# Patient Record
Sex: FEMALE | Race: WHITE | NOT HISPANIC OR LATINO
[De-identification: names, ages, dates, MRNs, and addresses within clinical notes are randomized per-mention and may not be internally consistent; named-entity substitution may affect disease eponyms.]

---

## 2017-02-06 ENCOUNTER — RX RENEWAL (OUTPATIENT)
Age: 52
End: 2017-02-06

## 2017-04-24 ENCOUNTER — APPOINTMENT (OUTPATIENT)
Dept: OBGYN | Facility: CLINIC | Age: 52
End: 2017-04-24

## 2017-04-24 VITALS
HEIGHT: 60 IN | WEIGHT: 143 LBS | BODY MASS INDEX: 28.07 KG/M2 | SYSTOLIC BLOOD PRESSURE: 118 MMHG | DIASTOLIC BLOOD PRESSURE: 70 MMHG

## 2017-04-27 LAB
CYTOLOGY CVX/VAG DOC THIN PREP: NORMAL
HPV HIGH+LOW RISK DNA PNL CVX: NEGATIVE

## 2017-05-31 ENCOUNTER — FORM ENCOUNTER (OUTPATIENT)
Age: 52
End: 2017-05-31

## 2017-05-31 ENCOUNTER — RX RENEWAL (OUTPATIENT)
Age: 52
End: 2017-05-31

## 2017-05-31 DIAGNOSIS — N64.89 OTHER SPECIFIED DISORDERS OF BREAST: ICD-10-CM

## 2017-06-01 ENCOUNTER — APPOINTMENT (OUTPATIENT)
Dept: MAMMOGRAPHY | Facility: CLINIC | Age: 52
End: 2017-06-01

## 2017-06-01 ENCOUNTER — APPOINTMENT (OUTPATIENT)
Dept: RADIOLOGY | Facility: CLINIC | Age: 52
End: 2017-06-01

## 2017-06-01 ENCOUNTER — OUTPATIENT (OUTPATIENT)
Dept: OUTPATIENT SERVICES | Facility: HOSPITAL | Age: 52
LOS: 1 days | End: 2017-06-01
Payer: COMMERCIAL

## 2017-06-01 DIAGNOSIS — Z00.8 ENCOUNTER FOR OTHER GENERAL EXAMINATION: ICD-10-CM

## 2017-06-01 PROCEDURE — 77063 BREAST TOMOSYNTHESIS BI: CPT

## 2017-06-01 PROCEDURE — 77080 DXA BONE DENSITY AXIAL: CPT

## 2017-06-01 PROCEDURE — 77067 SCR MAMMO BI INCL CAD: CPT

## 2017-06-06 ENCOUNTER — MESSAGE (OUTPATIENT)
Age: 52
End: 2017-06-06

## 2017-06-06 ENCOUNTER — FORM ENCOUNTER (OUTPATIENT)
Age: 52
End: 2017-06-06

## 2017-06-07 ENCOUNTER — APPOINTMENT (OUTPATIENT)
Dept: ULTRASOUND IMAGING | Facility: CLINIC | Age: 52
End: 2017-06-07

## 2017-06-07 ENCOUNTER — APPOINTMENT (OUTPATIENT)
Dept: MAMMOGRAPHY | Facility: CLINIC | Age: 52
End: 2017-06-07

## 2017-06-07 ENCOUNTER — OUTPATIENT (OUTPATIENT)
Dept: OUTPATIENT SERVICES | Facility: HOSPITAL | Age: 52
LOS: 1 days | End: 2017-06-07
Payer: COMMERCIAL

## 2017-06-07 DIAGNOSIS — Z00.8 ENCOUNTER FOR OTHER GENERAL EXAMINATION: ICD-10-CM

## 2017-06-07 PROCEDURE — 76641 ULTRASOUND BREAST COMPLETE: CPT

## 2017-06-07 PROCEDURE — G0279: CPT

## 2017-06-07 PROCEDURE — 77066 DX MAMMO INCL CAD BI: CPT

## 2017-06-12 ENCOUNTER — MESSAGE (OUTPATIENT)
Age: 52
End: 2017-06-12

## 2017-06-12 PROBLEM — N64.89 DISTORTION OF CONTOUR OF BREAST: Status: ACTIVE | Noted: 2017-06-12

## 2017-06-13 ENCOUNTER — RX RENEWAL (OUTPATIENT)
Age: 52
End: 2017-06-13

## 2017-06-13 RX ORDER — ESTRADIOL 1 MG/1
1 TABLET ORAL DAILY
Qty: 90 | Refills: 1 | Status: DISCONTINUED | COMMUNITY
Start: 2017-04-24 | End: 2017-06-13

## 2017-06-13 RX ORDER — PROGESTERONE 100 MG/1
100 CAPSULE ORAL
Qty: 90 | Refills: 1 | Status: DISCONTINUED | COMMUNITY
Start: 2017-04-24 | End: 2017-06-13

## 2017-11-22 ENCOUNTER — RX RENEWAL (OUTPATIENT)
Age: 52
End: 2017-11-22

## 2018-01-02 ENCOUNTER — OUTPATIENT (OUTPATIENT)
Dept: OUTPATIENT SERVICES | Facility: HOSPITAL | Age: 53
LOS: 1 days | Discharge: HOME | End: 2018-01-02

## 2018-01-02 VITALS
TEMPERATURE: 97 F | HEIGHT: 60 IN | RESPIRATION RATE: 27 BRPM | SYSTOLIC BLOOD PRESSURE: 113 MMHG | OXYGEN SATURATION: 98 % | DIASTOLIC BLOOD PRESSURE: 70 MMHG | WEIGHT: 141.1 LBS | HEART RATE: 69 BPM

## 2018-01-02 DIAGNOSIS — Z96.641 PRESENCE OF RIGHT ARTIFICIAL HIP JOINT: Chronic | ICD-10-CM

## 2018-01-02 DIAGNOSIS — Z80.0 FAMILY HISTORY OF MALIGNANT NEOPLASM OF DIGESTIVE ORGANS: ICD-10-CM

## 2018-01-02 RX ORDER — SODIUM CHLORIDE 9 MG/ML
1000 INJECTION INTRAMUSCULAR; INTRAVENOUS; SUBCUTANEOUS
Qty: 0 | Refills: 0 | Status: DISCONTINUED | OUTPATIENT
Start: 2018-01-02 | End: 2018-01-17

## 2018-01-02 RX ADMIN — SODIUM CHLORIDE 75 MILLILITER(S): 9 INJECTION INTRAMUSCULAR; INTRAVENOUS; SUBCUTANEOUS at 14:53

## 2018-01-02 NOTE — ASU PATIENT PROFILE, ADULT - VISION (WITH CORRECTIVE LENSES IF THE PATIENT USUALLY WEARS THEM):
right eye contact/Partially impaired: cannot see medication labels or newsprint, but can see obstacles in path, and the surrounding layout; can count fingers at arm's length

## 2018-01-10 DIAGNOSIS — Z80.0 FAMILY HISTORY OF MALIGNANT NEOPLASM OF DIGESTIVE ORGANS: ICD-10-CM

## 2018-01-10 DIAGNOSIS — J45.909 UNSPECIFIED ASTHMA, UNCOMPLICATED: ICD-10-CM

## 2018-01-10 DIAGNOSIS — Z88.0 ALLERGY STATUS TO PENICILLIN: ICD-10-CM

## 2018-01-10 DIAGNOSIS — K64.8 OTHER HEMORRHOIDS: ICD-10-CM

## 2018-01-10 DIAGNOSIS — Z88.1 ALLERGY STATUS TO OTHER ANTIBIOTIC AGENTS STATUS: ICD-10-CM

## 2018-01-10 DIAGNOSIS — Z96.641 PRESENCE OF RIGHT ARTIFICIAL HIP JOINT: ICD-10-CM

## 2018-01-10 DIAGNOSIS — F41.9 ANXIETY DISORDER, UNSPECIFIED: ICD-10-CM

## 2018-01-10 DIAGNOSIS — Z12.11 ENCOUNTER FOR SCREENING FOR MALIGNANT NEOPLASM OF COLON: ICD-10-CM

## 2018-01-11 ENCOUNTER — RX RENEWAL (OUTPATIENT)
Age: 53
End: 2018-01-11

## 2018-01-22 ENCOUNTER — APPOINTMENT (OUTPATIENT)
Dept: OBGYN | Facility: CLINIC | Age: 53
End: 2018-01-22
Payer: COMMERCIAL

## 2018-01-22 VITALS
BODY MASS INDEX: 27.09 KG/M2 | HEART RATE: 76 BPM | RESPIRATION RATE: 16 BRPM | HEIGHT: 60 IN | SYSTOLIC BLOOD PRESSURE: 110 MMHG | WEIGHT: 138 LBS | DIASTOLIC BLOOD PRESSURE: 70 MMHG

## 2018-01-22 PROCEDURE — 99214 OFFICE O/P EST MOD 30 MIN: CPT | Mod: 25

## 2018-01-22 RX ORDER — BENZONATATE 100 MG/1
100 CAPSULE ORAL
Qty: 15 | Refills: 0 | Status: DISCONTINUED | COMMUNITY
Start: 2017-01-03

## 2018-01-22 RX ORDER — SODIUM PHOSPHATE, MONOBASIC, MONOHYDRATE, SODIUM PHOSPHATE, DIBASIC ANHYDROUS 1.102; .398 G/1; G/1
1.102-0.398 TABLET ORAL
Qty: 32 | Refills: 0 | Status: DISCONTINUED | COMMUNITY
Start: 2017-12-29

## 2018-01-22 RX ORDER — FLUTICASONE PROPIONATE 50 UG/1
50 SPRAY, METERED NASAL
Qty: 16 | Refills: 0 | Status: DISCONTINUED | COMMUNITY
Start: 2017-01-03

## 2018-01-22 RX ORDER — ISOTRETINOIN 30 MG/1
30 CAPSULE ORAL
Qty: 60 | Refills: 0 | Status: DISCONTINUED | COMMUNITY
Start: 2017-09-01

## 2018-01-23 ENCOUNTER — FORM ENCOUNTER (OUTPATIENT)
Age: 53
End: 2018-01-23

## 2018-01-24 ENCOUNTER — APPOINTMENT (OUTPATIENT)
Dept: ULTRASOUND IMAGING | Facility: CLINIC | Age: 53
End: 2018-01-24
Payer: COMMERCIAL

## 2018-01-24 ENCOUNTER — OUTPATIENT (OUTPATIENT)
Dept: OUTPATIENT SERVICES | Facility: HOSPITAL | Age: 53
LOS: 1 days | End: 2018-01-24
Payer: COMMERCIAL

## 2018-01-24 DIAGNOSIS — Z00.8 ENCOUNTER FOR OTHER GENERAL EXAMINATION: ICD-10-CM

## 2018-01-24 DIAGNOSIS — Z96.641 PRESENCE OF RIGHT ARTIFICIAL HIP JOINT: Chronic | ICD-10-CM

## 2018-01-24 PROCEDURE — 76830 TRANSVAGINAL US NON-OB: CPT

## 2018-01-24 PROCEDURE — 76856 US EXAM PELVIC COMPLETE: CPT

## 2018-01-24 PROCEDURE — 76830 TRANSVAGINAL US NON-OB: CPT | Mod: 26

## 2018-01-24 PROCEDURE — 76856 US EXAM PELVIC COMPLETE: CPT | Mod: 26

## 2018-01-26 ENCOUNTER — CLINICAL ADVICE (OUTPATIENT)
Age: 53
End: 2018-01-26

## 2018-06-07 ENCOUNTER — FORM ENCOUNTER (OUTPATIENT)
Age: 53
End: 2018-06-07

## 2018-06-08 ENCOUNTER — APPOINTMENT (OUTPATIENT)
Dept: ULTRASOUND IMAGING | Facility: CLINIC | Age: 53
End: 2018-06-08

## 2018-06-08 ENCOUNTER — APPOINTMENT (OUTPATIENT)
Dept: MAMMOGRAPHY | Facility: CLINIC | Age: 53
End: 2018-06-08

## 2018-06-08 ENCOUNTER — OUTPATIENT (OUTPATIENT)
Dept: OUTPATIENT SERVICES | Facility: HOSPITAL | Age: 53
LOS: 1 days | End: 2018-06-08
Payer: COMMERCIAL

## 2018-06-08 DIAGNOSIS — Z00.8 ENCOUNTER FOR OTHER GENERAL EXAMINATION: ICD-10-CM

## 2018-06-08 DIAGNOSIS — Z96.641 PRESENCE OF RIGHT ARTIFICIAL HIP JOINT: Chronic | ICD-10-CM

## 2018-06-08 PROCEDURE — 77067 SCR MAMMO BI INCL CAD: CPT | Mod: 26

## 2018-06-08 PROCEDURE — 77063 BREAST TOMOSYNTHESIS BI: CPT | Mod: 26

## 2018-06-08 PROCEDURE — 77063 BREAST TOMOSYNTHESIS BI: CPT

## 2018-06-08 PROCEDURE — 77067 SCR MAMMO BI INCL CAD: CPT

## 2018-07-31 ENCOUNTER — RX RENEWAL (OUTPATIENT)
Age: 53
End: 2018-07-31

## 2018-08-01 ENCOUNTER — RX RENEWAL (OUTPATIENT)
Age: 53
End: 2018-08-01

## 2018-08-01 PROBLEM — F41.9 ANXIETY DISORDER, UNSPECIFIED: Chronic | Status: ACTIVE | Noted: 2018-01-02

## 2018-08-22 ENCOUNTER — APPOINTMENT (OUTPATIENT)
Dept: OBGYN | Facility: CLINIC | Age: 53
End: 2018-08-22
Payer: COMMERCIAL

## 2018-08-22 ENCOUNTER — RESULT CHARGE (OUTPATIENT)
Age: 53
End: 2018-08-22

## 2018-08-22 VITALS
BODY MASS INDEX: 28.47 KG/M2 | DIASTOLIC BLOOD PRESSURE: 70 MMHG | WEIGHT: 145 LBS | SYSTOLIC BLOOD PRESSURE: 114 MMHG | HEIGHT: 60 IN | RESPIRATION RATE: 16 BRPM | HEART RATE: 74 BPM

## 2018-08-22 DIAGNOSIS — N36.8 OTHER SPECIFIED DISORDERS OF URETHRA: ICD-10-CM

## 2018-08-22 DIAGNOSIS — Z80.0 FAMILY HISTORY OF MALIGNANT NEOPLASM OF DIGESTIVE ORGANS: ICD-10-CM

## 2018-08-22 DIAGNOSIS — R10.2 PELVIC AND PERINEAL PAIN: ICD-10-CM

## 2018-08-22 LAB
BILIRUB UR QL STRIP: NEGATIVE
BILIRUB UR QL STRIP: NORMAL
CLARITY UR: CLEAR
CLARITY UR: CLEAR
COLLECTION METHOD: NORMAL
COLLECTION METHOD: NORMAL
GLUCOSE UR-MCNC: NEGATIVE
GLUCOSE UR-MCNC: NORMAL
HCG UR QL: 0.2 EU/DL
HCG UR QL: 0.2 EU/DL
HGB UR QL STRIP.AUTO: NORMAL
HGB UR QL STRIP.AUTO: NORMAL
KETONES UR-MCNC: NEGATIVE
KETONES UR-MCNC: NORMAL
LEUKOCYTE ESTERASE UR QL STRIP: NEGATIVE
LEUKOCYTE ESTERASE UR QL STRIP: NORMAL
NITRITE UR QL STRIP: NEGATIVE
NITRITE UR QL STRIP: NORMAL
PH UR STRIP: 7.5
PH UR STRIP: 7.5
PROT UR STRIP-MCNC: NEGATIVE
PROT UR STRIP-MCNC: NORMAL
SP GR UR STRIP: 1.01
SP GR UR STRIP: 1.01

## 2018-08-22 PROCEDURE — 82270 OCCULT BLOOD FECES: CPT

## 2018-08-22 PROCEDURE — 81003 URINALYSIS AUTO W/O SCOPE: CPT | Mod: QW

## 2018-08-22 PROCEDURE — 99396 PREV VISIT EST AGE 40-64: CPT

## 2018-08-23 LAB
APPEARANCE: CLEAR
BACTERIA: NEGATIVE
BILIRUBIN URINE: NEGATIVE
BLOOD URINE: NEGATIVE
COLOR: YELLOW
GLUCOSE QUALITATIVE U: NEGATIVE MG/DL
HPV HIGH+LOW RISK DNA PNL CVX: NOT DETECTED
HYALINE CASTS: 0 /LPF
KETONES URINE: NEGATIVE
LEUKOCYTE ESTERASE URINE: NEGATIVE
MICROSCOPIC-UA: NORMAL
NITRITE URINE: NEGATIVE
PH URINE: 7.5
PROTEIN URINE: NEGATIVE MG/DL
RED BLOOD CELLS URINE: 2 /HPF
SPECIFIC GRAVITY URINE: 1.01
SQUAMOUS EPITHELIAL CELLS: 1 /HPF
UROBILINOGEN URINE: NEGATIVE MG/DL
WHITE BLOOD CELLS URINE: 0 /HPF

## 2018-08-24 LAB
CANDIDA VAG CYTO: NOT DETECTED
G VAGINALIS+PREV SP MTYP VAG QL MICRO: NOT DETECTED
T VAGINALIS VAG QL WET PREP: NOT DETECTED

## 2018-08-26 ENCOUNTER — RX RENEWAL (OUTPATIENT)
Age: 53
End: 2018-08-26

## 2018-08-26 LAB
BACTERIA UR CULT: ABNORMAL
CYTOLOGY CVX/VAG DOC THIN PREP: NORMAL
CYTOLOGY CVX/VAG DOC THIN PREP: NORMAL

## 2019-04-26 ENCOUNTER — RX RENEWAL (OUTPATIENT)
Age: 54
End: 2019-04-26

## 2019-04-29 ENCOUNTER — APPOINTMENT (OUTPATIENT)
Dept: OBGYN | Facility: CLINIC | Age: 54
End: 2019-04-29
Payer: COMMERCIAL

## 2019-04-29 VITALS
SYSTOLIC BLOOD PRESSURE: 98 MMHG | HEIGHT: 60 IN | WEIGHT: 145 LBS | BODY MASS INDEX: 28.47 KG/M2 | DIASTOLIC BLOOD PRESSURE: 60 MMHG

## 2019-04-29 PROCEDURE — 99213 OFFICE O/P EST LOW 20 MIN: CPT

## 2019-04-29 NOTE — HISTORY OF PRESENT ILLNESS
[___ Month(s) Ago] : [unfilled] month(s) ago [Definite:  ___ (Date)] : the last menstrual period was [unfilled] [Sexually Active] : is sexually active [Monogamous] : is monogamous [FreeTextEntry8] : denies

## 2019-04-29 NOTE — CHIEF COMPLAINT
[Follow Up] : follow up GYN visit [FreeTextEntry1] : 6 months surveillance of hormone replacement therapy

## 2019-05-01 ENCOUNTER — APPOINTMENT (OUTPATIENT)
Dept: ORTHOPEDIC SURGERY | Facility: CLINIC | Age: 54
End: 2019-05-01

## 2019-05-02 ENCOUNTER — APPOINTMENT (OUTPATIENT)
Dept: ORTHOPEDIC SURGERY | Facility: CLINIC | Age: 54
End: 2019-05-02
Payer: COMMERCIAL

## 2019-05-02 VITALS
HEART RATE: 77 BPM | SYSTOLIC BLOOD PRESSURE: 110 MMHG | DIASTOLIC BLOOD PRESSURE: 72 MMHG | TEMPERATURE: 98.6 F | HEIGHT: 60 IN | BODY MASS INDEX: 27.48 KG/M2 | WEIGHT: 140 LBS

## 2019-05-02 DIAGNOSIS — Z82.62 FAMILY HISTORY OF OSTEOPOROSIS: ICD-10-CM

## 2019-05-02 DIAGNOSIS — Z87.39 PERSONAL HISTORY OF OTHER DISEASES OF THE MUSCULOSKELETAL SYSTEM AND CONNECTIVE TISSUE: ICD-10-CM

## 2019-05-02 DIAGNOSIS — Z82.61 FAMILY HISTORY OF ARTHRITIS: ICD-10-CM

## 2019-05-02 PROCEDURE — 99214 OFFICE O/P EST MOD 30 MIN: CPT

## 2019-05-02 PROCEDURE — 73130 X-RAY EXAM OF HAND: CPT | Mod: 50

## 2019-05-03 PROBLEM — Z82.62 FAMILY HISTORY OF OSTEOPOROSIS: Status: ACTIVE | Noted: 2019-05-03

## 2019-05-03 PROBLEM — Z87.39 HISTORY OF ARTHRITIS: Status: RESOLVED | Noted: 2019-05-03 | Resolved: 2019-05-03

## 2019-05-03 PROBLEM — Z82.61 FAMILY HISTORY OF ARTHRITIS: Status: ACTIVE | Noted: 2019-05-03

## 2019-05-14 NOTE — HISTORY OF PRESENT ILLNESS
[(Does not interfere) 0] : the ailment interference is 0/10 (does not interfere) [4] : the ailment interference is 4/10 [5] : the ailment interference is 5/10 [de-identified] : The patient comes in today with complaints of pain to bilateral thumbs.  It is a little bit worse on the left.  The patient states she has had this condition for approximately 6 months.  This injury is not work related or due to an automobile accident.  The patient states the pain is constant, but varies in intensity.  The patient describes the pain as achy and sometimes sharp.  The patient notes rest makes her symptoms better, while typing or anything that involves gripping, makes her symptoms worse.  The patient indicates pain is at a level of 10 on a pain scale of 0-10. [] : No

## 2019-05-14 NOTE — DISCUSSION/SUMMARY
[de-identified] : At this time, due to basal joint arthritis of bilateral thumbs, I recommended a thumb splint, ice, anti-inflammatory and topical cream.  She will be reassessed in three to four weeks.

## 2019-05-14 NOTE — ADDENDUM
[FreeTextEntry1] : This note was written by America Torres on 05/09/2019 acting as a scribe for THIEN MACHUCA

## 2019-05-14 NOTE — PHYSICAL EXAM
[Normal] : Gait: normal [de-identified] : Right Thumb:\par                                                                                                 Claimant:  	   Normal:  \par \par Thumb Interphalangeal Hyperextension/Flexion		15H/80		   15H/80\par \par Thumb Metacarpophalangeal Hyperextension/Flexion	10/55		    10/55\par \par Finger DIP Joints Extension/Flexion			0/80		     0/80\par \par Finger PIP Joints Extension/Flexion 			0/100		     0/100\par \par Finger MCP Joints Hyperextension/Flexion 		(0-45H)/90	     (0-45H)/90\par \par \par Significant tenderness at the basal joint with a positive grind.  FDS, FDP intact.  No triggering.  No tenderness or swelling over the A1 pulley for each finger.  No instability to varus or valgus stress.  No motor or sensory deficits.  Less than two second capillary refill.  Skin is intact.  No rashes, scars or lesions.\par  \par Left Thumb:\par                                                                                                 Claimant:  	   Normal:  \par \par Thumb Interphalangeal Hyperextension/Flexion		15H/80		   15H/80\par \par Thumb Metacarpophalangeal Hyperextension/Flexion	10/55		    10/55\par \par Finger DIP Joints Extension/Flexion			0/80		     0/80\par \par Finger PIP Joints Extension/Flexion 			0/100		     0/100\par \par Finger MCP Joints Hyperextension/Flexion 		(0-45H)/90	     (0-45H)/90\par \par \par Significant tenderness at the basal joint with a positive grind.  FDS, FDP intact.  No triggering.  No tenderness or swelling over the A1 pulley for each finger.  No instability to varus or valgus stress.  No motor or sensory deficits.  Less than two second capillary refill.  Skin is intact.  No rashes, scars or lesions.\par  [de-identified] : Appearance:  Well-developed, well-nourished female in no acute distress.\par \par   [de-identified] : Radiographs, two views of bilateral thumbs, show moderate degenerative changes.

## 2019-05-29 ENCOUNTER — APPOINTMENT (OUTPATIENT)
Dept: ORTHOPEDIC SURGERY | Facility: CLINIC | Age: 54
End: 2019-05-29
Payer: COMMERCIAL

## 2019-05-29 VITALS
DIASTOLIC BLOOD PRESSURE: 79 MMHG | WEIGHT: 140 LBS | HEART RATE: 83 BPM | SYSTOLIC BLOOD PRESSURE: 125 MMHG | TEMPERATURE: 98.1 F | BODY MASS INDEX: 27.48 KG/M2 | HEIGHT: 60 IN

## 2019-05-29 PROCEDURE — 99213 OFFICE O/P EST LOW 20 MIN: CPT | Mod: 25

## 2019-05-29 PROCEDURE — 20600 DRAIN/INJ JOINT/BURSA W/O US: CPT | Mod: RT

## 2019-06-03 NOTE — PHYSICAL EXAM
[de-identified] : Right Fingers/Hand: \par Range of Motion\par                                                                                                 Claimant:  	   Normal:  \par \par Thumb Interphalangeal Hyperextension/Flexion		15H/80		   15H/80\par \par Thumb Metacarpophalangeal Hyperextension/Flexion	10/55		    10/55\par \par Finger DIP Joints Extension/Flexion			0/80		     0/80\par \par Finger PIP Joints Extension/Flexion 			0/100		     0/100\par \par Finger MCP Joints Hyperextension/Flexion 		(0-45H)/90	     (0-45H)/90\par \par \par Significant tenderness at the basal joint with a positive grind.  FDS, FDP intact.  No triggering.  No tenderness or swelling over the A1 pulley for each finger.  No instability to varus or valgus stress.  No motor or sensory deficits.  Less than two second capillary refill.  Skin is intact.  No rashes, scars or lesions.\par \par Left Fingers/Hand: \par Range of Motion\par                                                                                                 Claimant:  	   Normal:  \par \par Thumb Interphalangeal Hyperextension/Flexion		15H/80		   15H/80\par \par Thumb Metacarpophalangeal Hyperextension/Flexion	10/55		    10/55\par \par Finger DIP Joints Extension/Flexion			0/80		     0/80\par \par Finger PIP Joints Extension/Flexion 			0/100		     0/100\par \par Finger MCP Joints Hyperextension/Flexion 		(0-45H)/90	     (0-45H)/90\par \par \par Significant tenderness at the basal joint with a positive grind.  FDS, FDP intact.  No triggering.  No tenderness or swelling over the A1 pulley for each finger.  No instability to varus or valgus stress.  No motor or sensory deficits.  Less than two second capillary refill.  Skin is intact.  No rashes, scars or lesions.\par   [de-identified] : Ambulating with a normal gait.  Station:  Normal.  [de-identified] : General Appearance:  Well-developed, well-nourished female in no acute distress.

## 2019-06-03 NOTE — PROCEDURE
[de-identified] : Consent: \par At this time, I have recommended an injection to the right thumb.  The risks and benefits of the procedure were discussed with the patient in detail.  Upon verbal consent of the patient, we proceeded with the injection as noted below.  \par \par Procedure:  \par After a sterile prep, the patient underwent an injection of 2 cc of 1% Lidocaine without epinephrine and 0.5 cc of Kenalog into the right thumb.  The patient tolerated the procedure well.  There were no complications.

## 2019-06-03 NOTE — HISTORY OF PRESENT ILLNESS
[de-identified] : The patient comes in today for her bilateral thumbs with some persistent complaints, right worse than left.

## 2019-06-03 NOTE — DISCUSSION/SUMMARY
[de-identified] : At this time, I have recommended ice and elevation for the bilateral basal joint arthritis, right worse than left.  She will be reassessed in three to four weeks.

## 2019-06-03 NOTE — ADDENDUM
[FreeTextEntry1] : This note was written by Misty Ivan on 06/02/2019 acting as scribe for Jay Rowland III, MD\par

## 2019-06-12 ENCOUNTER — APPOINTMENT (OUTPATIENT)
Dept: ORTHOPEDIC SURGERY | Facility: CLINIC | Age: 54
End: 2019-06-12
Payer: COMMERCIAL

## 2019-06-12 VITALS
SYSTOLIC BLOOD PRESSURE: 118 MMHG | WEIGHT: 140 LBS | TEMPERATURE: 98.4 F | HEART RATE: 66 BPM | DIASTOLIC BLOOD PRESSURE: 75 MMHG | HEIGHT: 60 IN | BODY MASS INDEX: 27.48 KG/M2

## 2019-06-12 PROCEDURE — 20600 DRAIN/INJ JOINT/BURSA W/O US: CPT | Mod: LT

## 2019-06-19 NOTE — PHYSICAL EXAM
[de-identified] : Right Fingers/Hand: \par Range of Motion\par       Claimant: 	 Normal: \par \par Thumb Interphalangeal Hyperextension/Flexion		15H/80		 15H/80\par \par Thumb Metacarpophalangeal Hyperextension/Flexion	10/55		 10/55\par \par Finger DIP Joints Extension/Flexion			0/80		 0/80\par \par Finger PIP Joints Extension/Flexion 			0/100		 0/100\par \par Finger MCP Joints Hyperextension/Flexion 		(0-45H)/90	 (0-45H)/90\par \par \par Significant tenderness at the basal joint with a positive grind. FDS, FDP intact. No triggering. No tenderness or swelling over the A1 pulley for each finger. No instability to varus or valgus stress. No motor or sensory deficits. Less than two second capillary refill. Skin is intact. No rashes, scars or lesions.\par \par Left Fingers/Hand: \par Range of Motion\par       Claimant: 	 Normal: \par \par Thumb Interphalangeal Hyperextension/Flexion		15H/80		 15H/80\par \par Thumb Metacarpophalangeal Hyperextension/Flexion	10/55		 10/55\par \par Finger DIP Joints Extension/Flexion			0/80		 0/80\par \par Finger PIP Joints Extension/Flexion 			0/100		 0/100\par \par Finger MCP Joints Hyperextension/Flexion 		(0-45H)/90	 (0-45H)/90\par \par \par Significant tenderness at the basal joint with a positive grind. FDS, FDP intact. No triggering. No tenderness or swelling over the A1 pulley for each finger. No instability to varus or valgus stress. No motor or sensory deficits. Less than two second capillary refill. Skin is intact. No rashes, scars or lesions.\par

## 2019-06-19 NOTE — ADDENDUM
[FreeTextEntry1] : This note was written by Heather Bell on 06/17/2019 acting as scribe for MIKEY Bond MD

## 2019-06-19 NOTE — PROCEDURE
[de-identified] : Consent: \par At this time, I have recommended an injection to the left basal joint.  The risks and benefits of the procedure were discussed with the patient in detail.  Upon verbal consent of the patient, we proceeded with the injection as noted below.  \par \par Procedure:  \par After a sterile prep, the patient underwent an injection of 2 cc of 1% Lidocaine without epinephrine and 0.5 cc of Kenalog into the left basal joint.  The patient tolerated the procedure well.  There were no complications.  \par \par

## 2019-06-19 NOTE — HISTORY OF PRESENT ILLNESS
[de-identified] : The patient comes in today stating her right thumb is feeling much better.  She is still having pain to the left thumb.

## 2019-06-19 NOTE — DISCUSSION/SUMMARY
[de-identified] : At this time, due to basal joint arthritis of bilateral thumbs, I recommend ice and elevation for the left thumb and reassessment in 4-6 weeks or sooner shoulder her symptoms warrant.

## 2019-06-25 ENCOUNTER — FORM ENCOUNTER (OUTPATIENT)
Age: 54
End: 2019-06-25

## 2019-06-26 ENCOUNTER — APPOINTMENT (OUTPATIENT)
Dept: RADIOLOGY | Facility: CLINIC | Age: 54
End: 2019-06-26
Payer: COMMERCIAL

## 2019-06-26 ENCOUNTER — OUTPATIENT (OUTPATIENT)
Dept: OUTPATIENT SERVICES | Facility: HOSPITAL | Age: 54
LOS: 1 days | End: 2019-06-26
Payer: COMMERCIAL

## 2019-06-26 ENCOUNTER — APPOINTMENT (OUTPATIENT)
Dept: MAMMOGRAPHY | Facility: CLINIC | Age: 54
End: 2019-06-26
Payer: COMMERCIAL

## 2019-06-26 DIAGNOSIS — Z96.641 PRESENCE OF RIGHT ARTIFICIAL HIP JOINT: Chronic | ICD-10-CM

## 2019-06-26 DIAGNOSIS — Z00.8 ENCOUNTER FOR OTHER GENERAL EXAMINATION: ICD-10-CM

## 2019-06-26 PROCEDURE — 77080 DXA BONE DENSITY AXIAL: CPT | Mod: 26

## 2019-06-26 PROCEDURE — 77063 BREAST TOMOSYNTHESIS BI: CPT | Mod: 26

## 2019-06-26 PROCEDURE — 77080 DXA BONE DENSITY AXIAL: CPT

## 2019-06-26 PROCEDURE — 77063 BREAST TOMOSYNTHESIS BI: CPT

## 2019-06-26 PROCEDURE — 77067 SCR MAMMO BI INCL CAD: CPT | Mod: 26

## 2019-06-26 PROCEDURE — 77067 SCR MAMMO BI INCL CAD: CPT

## 2019-07-05 ENCOUNTER — RESULT REVIEW (OUTPATIENT)
Age: 54
End: 2019-07-05

## 2019-07-07 ENCOUNTER — FORM ENCOUNTER (OUTPATIENT)
Age: 54
End: 2019-07-07

## 2019-07-08 ENCOUNTER — OUTPATIENT (OUTPATIENT)
Dept: OUTPATIENT SERVICES | Facility: HOSPITAL | Age: 54
LOS: 1 days | End: 2019-07-08
Payer: COMMERCIAL

## 2019-07-08 ENCOUNTER — APPOINTMENT (OUTPATIENT)
Dept: ULTRASOUND IMAGING | Facility: CLINIC | Age: 54
End: 2019-07-08
Payer: COMMERCIAL

## 2019-07-08 DIAGNOSIS — Z96.641 PRESENCE OF RIGHT ARTIFICIAL HIP JOINT: Chronic | ICD-10-CM

## 2019-07-08 DIAGNOSIS — Z00.8 ENCOUNTER FOR OTHER GENERAL EXAMINATION: ICD-10-CM

## 2019-07-08 PROCEDURE — 76856 US EXAM PELVIC COMPLETE: CPT

## 2019-07-08 PROCEDURE — 76856 US EXAM PELVIC COMPLETE: CPT | Mod: 26

## 2019-07-08 PROCEDURE — 76641 ULTRASOUND BREAST COMPLETE: CPT

## 2019-07-08 PROCEDURE — 76641 ULTRASOUND BREAST COMPLETE: CPT | Mod: 26,50

## 2019-07-08 PROCEDURE — 76830 TRANSVAGINAL US NON-OB: CPT

## 2019-07-08 PROCEDURE — 76830 TRANSVAGINAL US NON-OB: CPT | Mod: 26

## 2019-07-09 RX ORDER — MISOPROSTOL 200 UG/1
200 TABLET ORAL
Qty: 2 | Refills: 0 | Status: COMPLETED | COMMUNITY
Start: 2019-07-09 | End: 2019-07-11

## 2019-08-26 ENCOUNTER — APPOINTMENT (OUTPATIENT)
Dept: OBGYN | Facility: CLINIC | Age: 54
End: 2019-08-26
Payer: COMMERCIAL

## 2019-08-26 DIAGNOSIS — R93.89 ABNORMAL FINDINGS ON DIAGNOSTIC IMAGING OF OTHER SPECIFIED BODY STRUCTURES: ICD-10-CM

## 2019-08-26 DIAGNOSIS — D25.9 LEIOMYOMA OF UTERUS, UNSPECIFIED: ICD-10-CM

## 2019-08-26 DIAGNOSIS — Z86.018 PERSONAL HISTORY OF OTHER BENIGN NEOPLASM: ICD-10-CM

## 2019-08-26 PROCEDURE — 58558Z: CUSTOM

## 2019-08-26 NOTE — PROCEDURE
[Endometrial Biopsy] : Endometrial biopsy [Abnormal US] : abnormal ultrasound findings [Risks] : risks [Benefits] : benefits [Alternatives] : alternatives [Patient] : patient [Infection] : infection [Allergic Reaction] : allergic reaction [Bleeding] : bleeding [Uterine Perforation] : uterine perforation [Pain] : pain [CONSENT OBTAINED] : written consent was obtained prior to the procedure. [Neg Pregnancy Test] : a pregnancy test was negative [Betadine] : Betadine [Paracervical Block] : A paracervical block was performed using [___ mL Injected] : [unfilled] ~UmL [1%] : 1% [Without Epi] : without epinephrine [Tenaculum] : a single toothed tenaculum [Easy Passage] : allowed easy passage of a uterine sound without dilation [Mid Position] : mid position [Pipelle] : a Pipelle endometrial suction curette [Sent to Histology] : the specimen was place in buffered formalin and sent for pathlogy [Scant] : a scant [Tolerated Well] : the patient tolerated the procedure well [No Complications] : there were no complications [de-identified] : Cytotec

## 2019-08-29 ENCOUNTER — CLINICAL ADVICE (OUTPATIENT)
Age: 54
End: 2019-08-29

## 2019-08-29 ENCOUNTER — RESULT REVIEW (OUTPATIENT)
Age: 54
End: 2019-08-29

## 2019-09-19 ENCOUNTER — APPOINTMENT (OUTPATIENT)
Age: 54
End: 2019-09-19
Payer: COMMERCIAL

## 2019-09-19 VITALS
SYSTOLIC BLOOD PRESSURE: 117 MMHG | TEMPERATURE: 98.1 F | WEIGHT: 140 LBS | HEART RATE: 71 BPM | HEIGHT: 60 IN | BODY MASS INDEX: 27.48 KG/M2 | DIASTOLIC BLOOD PRESSURE: 81 MMHG

## 2019-09-19 PROCEDURE — 20600 DRAIN/INJ JOINT/BURSA W/O US: CPT | Mod: 50

## 2019-09-19 PROCEDURE — 99213 OFFICE O/P EST LOW 20 MIN: CPT | Mod: 25

## 2019-09-24 ENCOUNTER — RX RENEWAL (OUTPATIENT)
Age: 54
End: 2019-09-24

## 2019-09-30 NOTE — PHYSICAL EXAM
[de-identified] : Right Hand/Fingers: \par Range of Motion: \par                                                                                                 Claimant:  	   Normal:  \par \par Thumb Interphalangeal Hyperextension/Flexion		15H/80		   15H/80\par \par Thumb Metacarpophalangeal Hyperextension/Flexion	10/55		    10/55\par \par Finger DIP Joints Extension/Flexion			0/80		     0/80\par \par Finger PIP Joints Extension/Flexion 			0/100		     0/100\par \par Finger MCP Joints Hyperextension/Flexion 		(0-45H)/90	     (0-45H)/90\par \par \par Significant tenderness at the basal joint with a positive grind.  FDS, FDP intact.  No triggering.  No tenderness or swelling over the A1 pulley for each finger.  No instability to varus or valgus stress.  No motor or sensory deficits.  Less than two second capillary refill.  Skin is intact.  No rashes, scars or lesions.\par \par Left Hand/Fingers: \par Range of Motion: \par                                                                                                 Claimant:  	   Normal:  \par \par Thumb Interphalangeal Hyperextension/Flexion		15H/80		   15H/80\par \par Thumb Metacarpophalangeal Hyperextension/Flexion	10/55		    10/55\par \par Finger DIP Joints Extension/Flexion			0/80		     0/80\par \par Finger PIP Joints Extension/Flexion 			0/100		     0/100\par \par Finger MCP Joints Hyperextension/Flexion 		(0-45H)/90	     (0-45H)/90\par \par \par Significant tenderness at the basal joint with a positive grind.  FDS, FDP intact.  No triggering.  No tenderness or swelling over the A1 pulley for each finger.  No instability to varus or valgus stress.  No motor or sensory deficits.  Less than two second capillary refill.  Skin is intact.  No rashes, scars or lesions.\par  [de-identified] : Ambulating with a normal gait.  Station:  Normal.  [de-identified] : General Appearance:  Well-developed, well-nourished female in no acute distress.

## 2019-09-30 NOTE — ADDENDUM
[FreeTextEntry1] : This note was written by Misty Ivan on 09/27/2019 acting as scribe for Jay Rowland III, MD

## 2019-09-30 NOTE — HISTORY OF PRESENT ILLNESS
[de-identified] : The patient comes in today with increasing complaints of pain to her bilateral thumbs.

## 2019-09-30 NOTE — PROCEDURE
[de-identified] : Consent: \par At this time, I have recommended an injection to the right and left basal joint.  The risks and benefits of the procedure were discussed with the patient in detail.  Upon verbal consent of the patient, we proceeded with the injections as noted below.  \par \par Procedure #1:  \par After a sterile prep, the patient underwent an injection of 2 cc of 1% Lidocaine without epinephrine and 0.5 cc of Kenalog into the right basal joint.  The patient tolerated the procedure well.  There were no complications. \par \par Procedure #2:  \par After a sterile prep, the patient underwent an injection of 2 cc of 1% Lidocaine without epinephrine and 0.5 cc of Kenalog into the left basal joint.  The patient tolerated the procedure well.  There were no complications.

## 2019-09-30 NOTE — DISCUSSION/SUMMARY
[de-identified] : At this time, I have recommended ice and elevation for the bilateral basal joint arthritis.  She will be reassessed in three to four weeks.

## 2019-11-11 ENCOUNTER — APPOINTMENT (OUTPATIENT)
Dept: OBGYN | Facility: CLINIC | Age: 54
End: 2019-11-11
Payer: COMMERCIAL

## 2019-11-11 VITALS
DIASTOLIC BLOOD PRESSURE: 60 MMHG | WEIGHT: 140 LBS | HEIGHT: 60 IN | SYSTOLIC BLOOD PRESSURE: 114 MMHG | BODY MASS INDEX: 27.48 KG/M2

## 2019-11-11 DIAGNOSIS — N95.2 POSTMENOPAUSAL ATROPHIC VAGINITIS: ICD-10-CM

## 2019-11-11 DIAGNOSIS — Z92.29 PERSONAL HISTORY OF OTHER DRUG THERAPY: ICD-10-CM

## 2019-11-11 PROCEDURE — 99396 PREV VISIT EST AGE 40-64: CPT

## 2019-11-11 PROCEDURE — 36415 COLL VENOUS BLD VENIPUNCTURE: CPT

## 2019-11-11 PROCEDURE — 82270 OCCULT BLOOD FECES: CPT

## 2019-11-11 NOTE — CHIEF COMPLAINT
[FreeTextEntry1] : Patient is a 54-year-old female presents for routine gynecologic examination in nature to surveillance visit. Patient has complaints of vaginal dryness. Patient is taking duavee and states she is doing well. Advised patient to use vaginal estrogen cream will send in a prescription. Patient has a significant family history maternal aunt with breast cancer and a paternal aunt with ovarian cancer and a paternal grandfather with colon cancer. Advised patient to have genetic cancer screening. This will be done today patient's last mammogram was July of 2019 and last bone density was June of 2019 [Annual Visit] : annual visit

## 2019-11-11 NOTE — PHYSICAL EXAM
[Awake] : awake [Acute Distress] : no acute distress [Alert] : alert [Mass] : no breast mass [Nipple Discharge] : no nipple discharge [Axillary LAD] : no axillary lymphadenopathy [Soft] : soft [Oriented x3] : oriented to person, place, and time [Tender] : non tender [Vulvar Atrophy] : vulvar atrophy [Normal] : cervix [Atrophy] : atrophy [No Bleeding] : there was no active vaginal bleeding [Pap Obtained] : a Pap smear was performed [Uterine Adnexae] : were not tender and not enlarged [Occult Blood] : occult blood test from digital rectal exam was negative [No Tenderness] : no rectal tenderness [Nl Sphincter Tone] : normal sphincter tone

## 2019-11-12 LAB — HPV HIGH+LOW RISK DNA PNL CVX: NOT DETECTED

## 2019-11-19 ENCOUNTER — OTHER (OUTPATIENT)
Age: 54
End: 2019-11-19

## 2019-12-10 ENCOUNTER — CLINICAL ADVICE (OUTPATIENT)
Age: 54
End: 2019-12-10

## 2019-12-17 ENCOUNTER — CHART COPY (OUTPATIENT)
Age: 54
End: 2019-12-17

## 2020-02-17 ENCOUNTER — APPOINTMENT (OUTPATIENT)
Dept: OBGYN | Facility: CLINIC | Age: 55
End: 2020-02-17
Payer: COMMERCIAL

## 2020-02-17 PROCEDURE — 11104 PUNCH BX SKIN SINGLE LESION: CPT

## 2020-02-17 PROCEDURE — 99213 OFFICE O/P EST LOW 20 MIN: CPT | Mod: 25

## 2020-02-17 NOTE — CHIEF COMPLAINT
[Follow Up] : follow up GYN visit [FreeTextEntry1] : Patient is a 54-year-old female who presents for a skin punch biopsy. Patient had a genetic cancer screening test done which was genetically inconclusive and was advised by the genetics testing company [facility] 30 skin punch biopsy to further evaluate the patient's genetic cancer risk and susceptibility. 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 Discussed this issue with the patient in detail. Consent obtained.

## 2020-02-24 ENCOUNTER — APPOINTMENT (OUTPATIENT)
Dept: OBGYN | Facility: CLINIC | Age: 55
End: 2020-02-24
Payer: COMMERCIAL

## 2020-02-24 VITALS
WEIGHT: 140 LBS | BODY MASS INDEX: 27.48 KG/M2 | HEIGHT: 60 IN | SYSTOLIC BLOOD PRESSURE: 100 MMHG | TEMPERATURE: 98.6 F | DIASTOLIC BLOOD PRESSURE: 70 MMHG

## 2020-02-24 DIAGNOSIS — Z91.89 OTHER SPECIFIED PERSONAL RISK FACTORS, NOT ELSEWHERE CLASSIFIED: ICD-10-CM

## 2020-02-24 DIAGNOSIS — Z80.3 FAMILY HISTORY OF MALIGNANT NEOPLASM OF BREAST: ICD-10-CM

## 2020-02-24 PROCEDURE — 99213 OFFICE O/P EST LOW 20 MIN: CPT

## 2020-02-24 NOTE — HISTORY OF PRESENT ILLNESS
[Last Mammogram ___] : Last Mammogram was [unfilled] [Last Bone Density ___] : Last bone density studies [unfilled] [Last Pap ___] : Last cervical pap smear was [unfilled] [Last Colonoscopy ___] : Last colonoscopy [unfilled] [Menarche Age: ____] : age at menarche was [unfilled]

## 2020-02-24 NOTE — CHIEF COMPLAINT
[Follow Up] : follow up GYN visit [FreeTextEntry1] : Patient is one week status post skin biopsy for further genetic cancer screening evaluation. Patient's blood test was inconclusive and the lab requested a skin sample. Results pending. Patient has no complaints

## 2020-03-05 ENCOUNTER — APPOINTMENT (OUTPATIENT)
Dept: ORTHOPEDIC SURGERY | Facility: CLINIC | Age: 55
End: 2020-03-05
Payer: COMMERCIAL

## 2020-03-05 VITALS
HEART RATE: 75 BPM | WEIGHT: 140 LBS | TEMPERATURE: 98.8 F | HEIGHT: 60 IN | DIASTOLIC BLOOD PRESSURE: 61 MMHG | BODY MASS INDEX: 27.48 KG/M2 | SYSTOLIC BLOOD PRESSURE: 98 MMHG

## 2020-03-05 PROCEDURE — 99213 OFFICE O/P EST LOW 20 MIN: CPT | Mod: 25

## 2020-03-05 PROCEDURE — 20600 DRAIN/INJ JOINT/BURSA W/O US: CPT | Mod: 50

## 2020-03-10 NOTE — HISTORY OF PRESENT ILLNESS
[de-identified] : The patient comes in today with continued complaints of pain to bilateral basal joints.

## 2020-03-10 NOTE — DISCUSSION/SUMMARY
[de-identified] : At this time, due to bilateral basal joint arthritis, the patient was given cortisone injections into bilateral basal joints.  I recommend ice, elevation and reassessment in 3-4 weeks should symptoms warrant.

## 2020-03-10 NOTE — PHYSICAL EXAM
[Normal] : Gait: normal [de-identified] : Right Hand/Fingers:\par Range of Motion:\par                                                                                                 Claimant:  	   Normal:  \par \par Thumb Interphalangeal Hyperextension/Flexion		15H/80		   15H/80\par \par Thumb Metacarpophalangeal Hyperextension/Flexion	10/55		    10/55\par \par Finger DIP Joints Extension/Flexion			0/80		     0/80\par \par Finger PIP Joints Extension/Flexion 			0/100		     0/100\par \par Finger MCP Joints Hyperextension/Flexion 		(0-45H)/90	     (0-45H)/90\par \par Significant tenderness at the basal joint with a positive grind.  FDS, FDP intact.  No triggering.  No tenderness or swelling over the A1 pulley for each finger.  No instability to varus or valgus stress.  No motor or sensory deficits.  Less than two second capillary refill.  Skin is intact.  No rashes, scars or lesions.\par  \par Left Hand/Fingers:\par Range of Motion:\par                                                                                                 Claimant:  	   Normal:  \par \par Thumb Interphalangeal Hyperextension/Flexion		15H/80		   15H/80\par \par Thumb Metacarpophalangeal Hyperextension/Flexion	10/55		    10/55\par \par Finger DIP Joints Extension/Flexion			0/80		     0/80\par \par Finger PIP Joints Extension/Flexion 			0/100		     0/100\par \par Finger MCP Joints Hyperextension/Flexion 		(0-45H)/90	     (0-45H)/90\par \par Significant tenderness at the basal joint with a positive grind.  FDS, FDP intact.  No triggering.  No tenderness or swelling over the A1 pulley for each finger.  No instability to varus or valgus stress.  No motor or sensory deficits.  Less than two second capillary refill.  Skin is intact.  No rashes, scars or lesions.\par   [de-identified] : Station:  Normal. [de-identified] : Appearance:  Well-developed, well-nourished female in no acute distress.\par

## 2020-03-10 NOTE — ADDENDUM
[FreeTextEntry1] : This note was written by Heather Bell on 03/09/2020 acting as scribe for Jay Rowland III, MD

## 2020-03-10 NOTE — PROCEDURE
[de-identified] : Consent: \par At this time, I have recommended an injection to the right and left basal joint.  The risks and benefits of the procedure were discussed with the patient in detail.  Upon verbal consent of the patient, we proceeded with the injection as noted below.  \par \par Procedure #1: \par After a sterile prep, the patient underwent an injection of 2 cc of 1% Lidocaine without epinephrine and 0.5 cc of Kenalog into the right basal joint. The patient tolerated the procedure well. There were no complications. \par \par Procedure #2: \par After a sterile prep, the patient underwent an injection of 2 cc of 1% Lidocaine without epinephrine and 0.5 cc of Kenalog into the left basal joint. The patient tolerated the procedure well. There were no complications. \par  \par

## 2020-04-13 ENCOUNTER — APPOINTMENT (OUTPATIENT)
Dept: OBGYN | Facility: CLINIC | Age: 55
End: 2020-04-13
Payer: COMMERCIAL

## 2020-04-13 DIAGNOSIS — Z91.89 OTHER SPECIFIED PERSONAL RISK FACTORS, NOT ELSEWHERE CLASSIFIED: ICD-10-CM

## 2020-04-13 PROCEDURE — 99214 OFFICE O/P EST MOD 30 MIN: CPT | Mod: 95

## 2020-05-11 ENCOUNTER — RX RENEWAL (OUTPATIENT)
Age: 55
End: 2020-05-11

## 2020-07-09 ENCOUNTER — APPOINTMENT (OUTPATIENT)
Dept: ORTHOPEDIC SURGERY | Facility: CLINIC | Age: 55
End: 2020-07-09
Payer: COMMERCIAL

## 2020-07-09 VITALS
HEIGHT: 60 IN | BODY MASS INDEX: 27.48 KG/M2 | SYSTOLIC BLOOD PRESSURE: 114 MMHG | DIASTOLIC BLOOD PRESSURE: 73 MMHG | WEIGHT: 140 LBS | HEART RATE: 86 BPM

## 2020-07-09 PROCEDURE — 20600 DRAIN/INJ JOINT/BURSA W/O US: CPT | Mod: 50

## 2020-07-14 NOTE — PHYSICAL EXAM
[Normal] : Gait: normal [de-identified] : Right Thumb:\par                                                                                              Claimant: 	    Normal: \par \par Thumb Interphalangeal Hyperextension/Flexion		15H/80	     15H/80\par \par Thumb Metacarpophalangeal Hyperextension/Flexion	10/55	     10/55\par  \par Finger DIP Joints Extension/Flexion			0/80	     0/80\par \par Finger PIP Joints Extension/Flexion 			0/100	     0/100\par \par Finger MCP Joints Hyperextension/Flexion   		(0-45H)/90   (0-45H)/90\par \par \par Significant tenderness at the basal joint with a positive grind. FDS, FDP intact. No triggering. No tenderness or swelling over the A1 pulley for each finger. No instability to varus or valgus stress. No motor or sensory deficits. Less than two second capillary refill. Skin is intact. No rashes, scars or lesions. \par \par Left Thumb:\par                                                                                              Claimant: 	    Normal: \par \par Thumb Interphalangeal Hyperextension/Flexion		15H/80	     15H/80\par \par Thumb Metacarpophalangeal Hyperextension/Flexion	10/55	     10/55\par  \par Finger DIP Joints Extension/Flexion			0/80	     0/80\par \par Finger PIP Joints Extension/Flexion 			0/100	     0/100\par \par Finger MCP Joints Hyperextension/Flexion   		(0-45H)/90   (0-45H)/90\par \par \par Significant tenderness at the basal joint with a positive grind. FDS, FDP intact. No triggering. No tenderness or swelling over the A1 pulley for each finger. No instability to varus or valgus stress. No motor or sensory deficits. Less than two second capillary refill. Skin is intact. No rashes, scars or lesions. \par  [de-identified] : Appearance:  Well developed, well-nourished female in no acute distress.\par

## 2020-07-14 NOTE — HISTORY OF PRESENT ILLNESS
[de-identified] : The patient comes in today for her bilateral thumbs.  She last had her injections in the beginning of March and comes in with persistent complaints of pain to her bilateral thumbs.  \par

## 2020-07-14 NOTE — ADDENDUM
[FreeTextEntry1] : This note was written by Nazario Gomez on 07/14/2020, acting as a scribe for Jay Rowland III, MD

## 2020-07-14 NOTE — PROCEDURE
[de-identified] : Consent: \par At this time, I have recommended a repeat injection to the basal joint of the right and left thumb. The risks and benefits of the procedure were discussed with the patient in detail.  Upon verbal consent of the patient, we proceeded with the injection as noted below.  \par \par Procedure:  \par After a sterile prep, the patient underwent an injection of 2 cc of 1% Lidocaine without epinephrine and 0.5 cc of Kenalog into the basal joint of the right and left thumb.  The patient tolerated the procedures well.  There were no complications.  \par \par \par

## 2020-07-14 NOTE — DISCUSSION/SUMMARY
[de-identified] : At this time, I recommended ice and elevation.  She will be reassessed in 6 weeks for the bilateral basal joint arthritis.  I advised her should this persist, I will refer her to Dr. Khalil for surgical consultation.\par \par

## 2020-08-21 ENCOUNTER — APPOINTMENT (OUTPATIENT)
Dept: OBGYN | Facility: CLINIC | Age: 55
End: 2020-08-21
Payer: COMMERCIAL

## 2020-08-21 VITALS — DIASTOLIC BLOOD PRESSURE: 60 MMHG | HEIGHT: 60 IN | SYSTOLIC BLOOD PRESSURE: 100 MMHG

## 2020-08-21 DIAGNOSIS — N95.2 POSTMENOPAUSAL ATROPHIC VAGINITIS: ICD-10-CM

## 2020-08-21 PROCEDURE — 99214 OFFICE O/P EST MOD 30 MIN: CPT

## 2020-08-21 RX ORDER — TRIAMCINOLONE ACETONIDE 1 MG/G
0.1 CREAM TOPICAL 3 TIMES DAILY
Qty: 15 | Refills: 0 | Status: DISCONTINUED | COMMUNITY
Start: 2018-08-22 | End: 2020-08-21

## 2020-08-21 RX ORDER — CONJUGATED ESTROGENS/BAZEDOXIFENE .45; 2 MG/1; MG/1
0.45-2 TABLET, FILM COATED ORAL
Qty: 30 | Refills: 0 | Status: DISCONTINUED | COMMUNITY
Start: 2019-11-11 | End: 2020-08-21

## 2020-08-21 RX ORDER — DICLOFENAC SODIUM 10 MG/G
1 GEL TOPICAL
Qty: 1 | Refills: 0 | Status: DISCONTINUED | COMMUNITY
Start: 2019-05-02 | End: 2020-08-21

## 2020-08-21 RX ORDER — CLINDAMYCIN HYDROCHLORIDE 300 MG/1
300 CAPSULE ORAL EVERY 6 HOURS
Qty: 20 | Refills: 0 | Status: DISCONTINUED | COMMUNITY
Start: 2018-08-26 | End: 2020-08-21

## 2020-08-21 RX ORDER — NYSTATIN 100000 U/G
100000 OINTMENT TOPICAL 4 TIMES DAILY
Qty: 1 | Refills: 0 | Status: DISCONTINUED | COMMUNITY
Start: 2018-08-22 | End: 2020-08-21

## 2020-08-21 NOTE — PHYSICAL EXAM
[Awake] : awake [Alert] : alert [Acute Distress] : no acute distress [LAD] : no lymphadenopathy [Thyroid Nodule] : no thyroid nodule [Mass] : no breast mass [Goiter] : no goiter [Nipple Discharge] : no nipple discharge [Tender] : no tenderness [Axillary LAD] : no axillary lymphadenopathy

## 2020-10-02 ENCOUNTER — TRANSCRIPTION ENCOUNTER (OUTPATIENT)
Age: 55
End: 2020-10-02

## 2020-11-02 ENCOUNTER — APPOINTMENT (OUTPATIENT)
Dept: ORTHOPEDIC SURGERY | Facility: CLINIC | Age: 55
End: 2020-11-02
Payer: COMMERCIAL

## 2020-11-02 VITALS
WEIGHT: 140 LBS | HEART RATE: 78 BPM | SYSTOLIC BLOOD PRESSURE: 115 MMHG | BODY MASS INDEX: 27.48 KG/M2 | TEMPERATURE: 98 F | DIASTOLIC BLOOD PRESSURE: 70 MMHG | HEIGHT: 60 IN

## 2020-11-02 PROCEDURE — 99072 ADDL SUPL MATRL&STAF TM PHE: CPT

## 2020-11-02 PROCEDURE — 20600 DRAIN/INJ JOINT/BURSA W/O US: CPT | Mod: RT

## 2020-11-10 ENCOUNTER — APPOINTMENT (OUTPATIENT)
Dept: ORTHOPEDIC SURGERY | Facility: CLINIC | Age: 55
End: 2020-11-10
Payer: COMMERCIAL

## 2020-11-10 VITALS
DIASTOLIC BLOOD PRESSURE: 63 MMHG | BODY MASS INDEX: 26.43 KG/M2 | HEIGHT: 61 IN | WEIGHT: 140 LBS | HEART RATE: 64 BPM | SYSTOLIC BLOOD PRESSURE: 101 MMHG

## 2020-11-10 PROCEDURE — 99072 ADDL SUPL MATRL&STAF TM PHE: CPT

## 2020-11-10 PROCEDURE — 99214 OFFICE O/P EST MOD 30 MIN: CPT

## 2020-11-10 NOTE — PHYSICAL EXAM
[Normal RUE] : Right Upper Extremity: No scars, rashes, lesions, ulcers, skin intact [Normal LUE] : Left Upper Extremity: No scars, rashes, lesions, ulcers, skin intact [Normal Finger/nose] : finger to nose coordination [Normal] : no peripheral adenopathy appreciated [de-identified] : There is tenderness over the bilateral STT joint with a positive grind test and shoulder deformity. There is a positive crank test. There is swelling appreciated over the affected STT joint bilaterally. There is no evidence of infection or lymphadenopathy bilaterally to the level of the elbows There is no evidence of MCP hyperextension bilaterally. There is a negative Finkelstein's test There is no tenderness over the A-1 pulleys bilaterally. 5/5 stregnth bilaterally. \par \par The wrists have a symmetric and full range of motion bilaterally with no pain upon forced flexion, extension, pronation and supination. There is no tenderness over the scaphoid scapholunate region ligaments and no tenderness of the TFCC bilaterally. There is no tenderness of the pisotriquetral hamate hook. The second through fifth CMC his is stable and nontender bilaterally.\par There is a negative carpal tunnel compression test or Tinel's bilaterally.   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

## 2020-11-10 NOTE — ASSESSMENT
[FreeTextEntry1] :  55-year-old female with pain at the base of bilateral onsistent with STT arthritis. I recommend continued conservative treatment this time she is given comfort immobilizers and a prescription for normal activity and that she will take as needed.She will followup as needed for repeat cortisone injection.

## 2020-11-10 NOTE — HISTORY OF PRESENT ILLNESS
[FreeTextEntry1] : a 55-year-old female presents for evaluation of pain at the base of bilateral thumbs right worse than left. Her symptoms have been present for approximately2 years she denies trauma or inciting event. She has no aching pain at the base of the thumbwith a circumferential radiation. The pain is worse with activity and improved with rest as well as intermittent cortisone injections.

## 2020-11-11 NOTE — PHYSICAL EXAM
[Normal] : Gait: normal [de-identified] : Right Thumb:\par                                                                                              Claimant: 	    Normal: \par \par Thumb Interphalangeal Hyperextension/Flexion		15H/80	     15H/80\par \par Thumb Metacarpophalangeal Hyperextension/Flexion	10/55	     10/55\par  \par Finger DIP Joints Extension/Flexion			0/80	     0/80\par \par Finger PIP Joints Extension/Flexion 			0/100	     0/100\par \par Finger MCP Joints Hyperextension/Flexion   		(0-45H)/90   (0-45H)/90\par \par \par Significant tenderness at the basal joint with a positive grind. FDS, FDP intact. No triggering. No tenderness or swelling over the A1 pulley for each finger. No instability to varus or valgus stress. No motor or sensory deficits. Less than two second capillary refill. Skin is intact. No rashes, scars or lesions. \par \par Left Thumb:\par                                                                                              Claimant: 	    Normal: \par \par Thumb Interphalangeal Hyperextension/Flexion		15H/80	     15H/80\par \par Thumb Metacarpophalangeal Hyperextension/Flexion	10/55	     10/55\par  \par Finger DIP Joints Extension/Flexion			0/80	     0/80\par \par Finger PIP Joints Extension/Flexion 			0/100	     0/100\par \par Finger MCP Joints Hyperextension/Flexion   		(0-45H)/90   (0-45H)/90\par \par \par Significant tenderness at the basal joint with a positive grind. FDS, FDP intact. No triggering. No tenderness or swelling over the A1 pulley for each finger. No instability to varus or valgus stress. No motor or sensory deficits. Less than two second capillary refill. Skin is intact. No rashes, scars or lesions. \par  [de-identified] : Gait: normal    Station: normal  [de-identified] : Appearance:  Well developed, well-nourished female in no acute distress.\par

## 2020-11-11 NOTE — ADDENDUM
[FreeTextEntry1] : This note was written by Radha Hdz on 11/05/2020 acting as scribe for Jay Rowland III, MD

## 2020-11-11 NOTE — PROCEDURE
[de-identified] : Consent: \par At this time, I have recommended an injection to the right thumb.  The risks and benefits of the procedure were discussed with the patient in detail.  Upon verbal consent of the patient, we proceeded with the injection as noted below.  \par \par Procedure:  \par After a sterile prep, the patient underwent an injection of 2 cc of 1% Lidocaine without epinephrine and 0.5 cc of Kenalog into the right thumb.  The patient tolerated the procedure well.  There were no complications.  \par \par

## 2020-11-11 NOTE — HISTORY OF PRESENT ILLNESS
[de-identified] : Ro comes in today with increasing complaints of pain to bilateral thumbs, right worse than left.

## 2020-11-11 NOTE — DISCUSSION/SUMMARY
[de-identified] : At this time I recommend ice and anti-inflammatory.  She will be reassessed in three or four weeks for the bilateral basal joint arthritis.

## 2020-12-07 ENCOUNTER — OUTPATIENT (OUTPATIENT)
Dept: OUTPATIENT SERVICES | Facility: HOSPITAL | Age: 55
LOS: 1 days | End: 2020-12-07
Payer: COMMERCIAL

## 2020-12-07 ENCOUNTER — APPOINTMENT (OUTPATIENT)
Dept: MAMMOGRAPHY | Facility: HOSPITAL | Age: 55
End: 2020-12-07
Payer: COMMERCIAL

## 2020-12-07 ENCOUNTER — APPOINTMENT (OUTPATIENT)
Dept: ULTRASOUND IMAGING | Facility: HOSPITAL | Age: 55
End: 2020-12-07
Payer: COMMERCIAL

## 2020-12-07 DIAGNOSIS — Z00.8 ENCOUNTER FOR OTHER GENERAL EXAMINATION: ICD-10-CM

## 2020-12-07 DIAGNOSIS — Z96.641 PRESENCE OF RIGHT ARTIFICIAL HIP JOINT: Chronic | ICD-10-CM

## 2020-12-07 PROCEDURE — 77067 SCR MAMMO BI INCL CAD: CPT | Mod: 26

## 2020-12-07 PROCEDURE — 77063 BREAST TOMOSYNTHESIS BI: CPT

## 2020-12-07 PROCEDURE — 76641 ULTRASOUND BREAST COMPLETE: CPT | Mod: 26,50

## 2020-12-07 PROCEDURE — 77063 BREAST TOMOSYNTHESIS BI: CPT | Mod: 26

## 2020-12-07 PROCEDURE — 77067 SCR MAMMO BI INCL CAD: CPT

## 2020-12-07 PROCEDURE — 76641 ULTRASOUND BREAST COMPLETE: CPT

## 2021-01-06 ENCOUNTER — APPOINTMENT (OUTPATIENT)
Dept: ORTHOPEDIC SURGERY | Facility: CLINIC | Age: 56
End: 2021-01-06
Payer: COMMERCIAL

## 2021-01-06 VITALS
DIASTOLIC BLOOD PRESSURE: 73 MMHG | SYSTOLIC BLOOD PRESSURE: 119 MMHG | HEIGHT: 60 IN | WEIGHT: 145 LBS | TEMPERATURE: 98.3 F | BODY MASS INDEX: 28.47 KG/M2 | HEART RATE: 71 BPM

## 2021-01-06 PROCEDURE — 99072 ADDL SUPL MATRL&STAF TM PHE: CPT

## 2021-01-06 PROCEDURE — 20600 DRAIN/INJ JOINT/BURSA W/O US: CPT | Mod: LT

## 2021-01-07 NOTE — PROCEDURE
[de-identified] : Consent: \par At this time, I have recommended a repeat injection to the left thumb.  The risks and benefits of the procedure were discussed with the patient in detail.  Upon verbal consent of the patient, we proceeded with the injection as noted below.  \par \par Procedure:  \par After a sterile prep, the patient underwent a repeat injection of 2 cc of 1% Lidocaine without epinephrine and 0.5 cc of Kenalog into the left thumb.  The patient tolerated the procedure well.  There were no complications.  \par \par

## 2021-01-07 NOTE — PHYSICAL EXAM
[de-identified] : Left thumb:\par \par                                                                                                 Claimant:  	   Normal:  \par \par Thumb Interphalangeal Hyperextension/Flexion		15H/80		   15H/80\par \par Thumb Metacarpophalangeal Hyperextension/Flexion	10/55		    10/55\par \par Finger DIP Joints Extension/Flexion			0/80		     0/80\par \par Finger PIP Joints Extension/Flexion 			0/100		     0/100\par \par Finger MCP Joints Hyperextension/Flexion 		(0-45H)/90	     (0-45H)/90\par \par \par Significant tenderness at the basal joint with a positive grind.  FDS, FDP intact.  No triggering.  No tenderness or swelling over the A1 pulley for each finger.  No instability to varus or valgus stress.  No motor or sensory deficits.  Less than two second capillary refill.  Skin is intact.  No rashes, scars or lesions.\par   [de-identified] : Gait: normal    Station: normal  [de-identified] : Appearance: Well-developed, well-nourished female  in no acute distress.

## 2021-01-07 NOTE — ADDENDUM
[FreeTextEntry1] : This note was written by Radha Hdz on 01/07/2021 acting as scribe for Jay Rowland III, MD

## 2021-01-07 NOTE — DISCUSSION/SUMMARY
[de-identified] : At this time I recommend ice and elevation for the basal joint arthritis, left thumb.  She will be reassessed in three or four weeks.

## 2021-02-01 ENCOUNTER — RX RENEWAL (OUTPATIENT)
Age: 56
End: 2021-02-01

## 2021-02-23 ENCOUNTER — APPOINTMENT (OUTPATIENT)
Dept: ORTHOPEDIC SURGERY | Facility: CLINIC | Age: 56
End: 2021-02-23
Payer: COMMERCIAL

## 2021-02-23 PROCEDURE — 99213 OFFICE O/P EST LOW 20 MIN: CPT | Mod: 25

## 2021-02-23 PROCEDURE — 20600 DRAIN/INJ JOINT/BURSA W/O US: CPT | Mod: RT

## 2021-02-23 PROCEDURE — 73130 X-RAY EXAM OF HAND: CPT | Mod: 50

## 2021-02-23 PROCEDURE — 99072 ADDL SUPL MATRL&STAF TM PHE: CPT

## 2021-02-23 NOTE — PHYSICAL EXAM
[Normal RUE] : Right Upper Extremity: No scars, rashes, lesions, ulcers, skin intact [Normal LUE] : Left Upper Extremity: No scars, rashes, lesions, ulcers, skin intact [Normal Finger/nose] : finger to nose coordination [Normal] : no peripheral adenopathy appreciated [de-identified] : There is tenderness over the bilateral STT joint with a positive grind test and shoulder deformity. There is a positive crank test. There is swelling appreciated over the affected STT joint bilaterally. There is no evidence of infection or lymphadenopathy bilaterally to the level of the elbows There is no evidence of MCP hyperextension bilaterally. There is a negative Finkelstein's test There is no tenderness over the A-1 pulleys bilaterally. 5/5 strength bilaterally. \par \par The wrists have a symmetric and full range of motion bilaterally with no pain upon forced flexion, extension, pronation and supination. There is no tenderness over the scaphoid scapholunate region ligaments and no tenderness of the TFCC bilaterally. There is no tenderness of the pisotriquetral hamate hook. The second through fifth CMC his is stable and nontender bilaterally.\par There is a negative carpal tunnel compression test or Tinel's bilaterally.   [de-identified] : abhijitr [de-identified] : 3 x-ray views of the bilateral hands taken today, reveal advanced STT arthritis on the right, mild to moderate STT arthritis on the left

## 2021-02-23 NOTE — PROCEDURE
[FreeTextEntry1] : My impression is that the patient has STT joint osteoarthritis. We discussed treatment options and she elected to undergo a basal joint injection. The risks, benefits, and alternatives were discussed with the patient. This included, but was not limited to nerve injury, infection, subcutaneous atrophy, skin depigmentation etc. Under informed consent and sterile conditions, the right base of the thumb was anesthetized with 1% lidocaine. The right STT joint was then injected with 1cc of 40mg depo. It is my hopes that this significantly alleviates the patients symptoms.\par

## 2021-02-23 NOTE — ASSESSMENT
[FreeTextEntry1] :  55-year-old female with bilateral STT arthritis.\par The natural history and treatment of degenerative arthritis was discussed with the patient at length today. The spectrum of treatment including nonoperative modalities to surgical intervention was elucidated. A discussion about future surgical intervention was had today, but at this time the patient would like to remain conservative. \par \par I recommend continued conservative treatment this time with comfort thumb immobilizers and a activity modifications.  She received a steroid injection today to the right thumb, and tolerated well. She will follow up as needed for repeat injections vs surgical intervention planning. All questions answered.

## 2021-02-23 NOTE — HISTORY OF PRESENT ILLNESS
[FreeTextEntry1] : a 55-year-old female presents for evaluation of pain at the base of bilateral thumbs right worse than left. Her symptoms have been present for approximately 2 years she denies trauma or inciting event. She has no aching pain at the base of the thumb with a circumferential radiation. 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 She notes her symptoms are worse on the right thumb than the left. She has recently received a steroid injection to her left thumb with Dr. Rowland, and notes some improvement following. She reports worsening of her right thumb pain, which is limiting her activities during the day. She reports pain at rest as well. She would like to consider a cortisone injection to the right thumb today.

## 2021-04-29 ENCOUNTER — NON-APPOINTMENT (OUTPATIENT)
Age: 56
End: 2021-04-29

## 2021-05-05 ENCOUNTER — APPOINTMENT (OUTPATIENT)
Dept: ORTHOPEDIC SURGERY | Facility: CLINIC | Age: 56
End: 2021-05-05
Payer: COMMERCIAL

## 2021-05-05 VITALS
WEIGHT: 145 LBS | BODY MASS INDEX: 28.47 KG/M2 | SYSTOLIC BLOOD PRESSURE: 117 MMHG | HEART RATE: 76 BPM | DIASTOLIC BLOOD PRESSURE: 74 MMHG | HEIGHT: 60 IN

## 2021-05-05 PROCEDURE — 99213 OFFICE O/P EST LOW 20 MIN: CPT

## 2021-05-05 PROCEDURE — 99072 ADDL SUPL MATRL&STAF TM PHE: CPT

## 2021-05-05 PROCEDURE — 73502 X-RAY EXAM HIP UNI 2-3 VIEWS: CPT | Mod: LT

## 2021-05-10 NOTE — DISCUSSION/SUMMARY
[de-identified] : At this time, due to osteoarthritis of the left hip, I recommended she be referred for an intra-articular injection.\par

## 2021-05-10 NOTE — PHYSICAL EXAM
[de-identified] : Left Hip: Range of Motion in Degrees:\par 	                                  Claimant:	Normal:	\par Flexion (Active) 	                  120 	                120-degrees	\par Flexion (Passive)	                  120	                120-degrees	\par Extension (Active)	                  -30	                -30-degrees	\par Extension (Passive)	  -30	                -30-degrees	\par Abduction (Active)	                  45-50	                60-00-dwmifcj	\par Abduction (Passive)	  45-50	                11-73-nsqgrna	\par Adduction (Active)	                  20-30	                67-49-zwdiely	\par Adduction (Passive)	  20-30	                79-38-olidcqs	\par Internal Rotation (Active) 	 35	                35-degrees	\par Internal Rotation (Passive)	 35	                35-degrees	\par External Rotation (Active)	 45	                45-degrees	\par External Rotation (Passive)	 45	                45-degrees	\par \par Tenderness into the groin with internal and external rotation and axial load.  No tenderness to palpation over the greater trochanter.  Negative Trendelenburg.  No tenderness with resisted abduction.  No weakness to flexion, extension, abduction or adduction.  No evidence of instability.  No motor or sensory deficits.  2+ DP and PT pulses.  Skin is intact.  No scars, rashes or lesions.  \par  \par Right Hip: Range of Motion in Degrees:\par 	                                 Claimant:	   Normal:	\par Flexion (Active) 	                 120 	   120-degrees	\par Flexion (Passive)	                 120	   120-degrees	\par Extension (Active)	                 -30	   -30-degrees	\par Extension (Passive)	 -30	   -30-degrees	\par Abduction (Active)	                 45-50	   56-49-zoskcup	\par Abduction (Passive)	 45-50	   50-20-jyzyqxn	\par Adduction (Active)  	 20-30	   03-75-caiysde	\par Adduction (Passive)	 20-30	   15-02-frvfiim	\par Internal Rotation (Active) 	 35	   35-degrees	\par Internal Rotation (Passive)	 35	   35-degrees	\par External Rotation (Active)	 45	   45-degrees	\par External Rotation (Passive)	 45	   45-degrees	\par \par No tenderness with internal or external rotation or axial load.  No tenderness to palpation over the greater trochanter.  Negative Trendelenburg.  No tenderness with resisted abduction.  No weakness to flexion, extension, abduction or adduction.  No evidence of instability.  No motor or sensory deficits.  2+ DP and PT pulses.  Skin is intact.  No scars, rashes or lesions.  \par   [de-identified] : Gait and Station:  Ambulating with a slightly antalgic to antalgic gait.  Normal Station.  [de-identified] : Appearance:  Well developed, well-nourished female in no acute distress.\par   [de-identified] : Radiographs, two to three views of the left hip and pelvis, show early severe degenerative changes.\par

## 2021-05-10 NOTE — ADDENDUM
[FreeTextEntry1] : This note was written by Nazario Gomez on 05/10/2021, acting as a scribe for Jay Rowland III, MD

## 2021-05-12 ENCOUNTER — APPOINTMENT (OUTPATIENT)
Dept: ULTRASOUND IMAGING | Facility: CLINIC | Age: 56
End: 2021-05-12
Payer: COMMERCIAL

## 2021-05-12 ENCOUNTER — OUTPATIENT (OUTPATIENT)
Dept: OUTPATIENT SERVICES | Facility: HOSPITAL | Age: 56
LOS: 1 days | End: 2021-05-12
Payer: COMMERCIAL

## 2021-05-12 DIAGNOSIS — M16.12 UNILATERAL PRIMARY OSTEOARTHRITIS, LEFT HIP: ICD-10-CM

## 2021-05-12 DIAGNOSIS — Z96.641 PRESENCE OF RIGHT ARTIFICIAL HIP JOINT: Chronic | ICD-10-CM

## 2021-05-12 DIAGNOSIS — Z00.8 ENCOUNTER FOR OTHER GENERAL EXAMINATION: ICD-10-CM

## 2021-05-12 PROCEDURE — 20611 DRAIN/INJ JOINT/BURSA W/US: CPT | Mod: LT

## 2021-05-12 PROCEDURE — 20611 DRAIN/INJ JOINT/BURSA W/US: CPT

## 2021-07-05 ENCOUNTER — RX RENEWAL (OUTPATIENT)
Age: 56
End: 2021-07-05

## 2021-07-19 ENCOUNTER — APPOINTMENT (OUTPATIENT)
Dept: ORTHOPEDIC SURGERY | Facility: CLINIC | Age: 56
End: 2021-07-19
Payer: COMMERCIAL

## 2021-07-19 VITALS
BODY MASS INDEX: 28.47 KG/M2 | HEIGHT: 60 IN | WEIGHT: 145 LBS | SYSTOLIC BLOOD PRESSURE: 112 MMHG | HEART RATE: 71 BPM | DIASTOLIC BLOOD PRESSURE: 73 MMHG

## 2021-07-19 DIAGNOSIS — S76.212A STRAIN OF ADDUCTOR MUSCLE, FASCIA AND TENDON OF LEFT THIGH, INITIAL ENCOUNTER: ICD-10-CM

## 2021-07-19 PROCEDURE — 99072 ADDL SUPL MATRL&STAF TM PHE: CPT

## 2021-07-19 PROCEDURE — 99213 OFFICE O/P EST LOW 20 MIN: CPT

## 2021-07-22 NOTE — HISTORY OF PRESENT ILLNESS
[de-identified] : The patient comes in today for her left hip.  She states she is better from her injection, but then she did something and has had significant pain.

## 2021-07-22 NOTE — DISCUSSION/SUMMARY
[de-identified] : At this time, due to adductor strain of the left hip (groin pull), I started her on compression shorts and physical therapy.  She will be reassessed in four weeks.

## 2021-07-22 NOTE — PHYSICAL EXAM
[de-identified] : Left Hip:\par Range of Motion in Degrees:\par 	                                  Claimant:	Normal:	\par Flexion (Active) 	                  120 	                120-degrees	\par Flexion (Passive)	                  120	                120-degrees	\par Extension (Active)	                  -30	                -30-degrees	\par Extension (Passive)	  -30	                -30-degrees	\par Abduction (Active)	                  45-50	                76-29-zhpdgoy	\par Abduction (Passive)	  45-50	                21-98-dnkcfzj	\par Adduction (Active)	                  20-30	                63-53-lczsxhb	\par Adduction (Passive)	  20-30	                35-62-znhiery	\par Internal Rotation (Active) 	 35	                35-degrees	\par Internal Rotation (Passive)	 35	                35-degrees	\par External Rotation (Active)	 45	                45-degrees	\par External Rotation (Passive)	 45	                45-degrees	\par \par Adductor strain.  Tenderness over the adductor muscle. Tenderness into the groin with internal and external rotation and axial load.  No tenderness to palpation over the greater trochanter.  Negative Trendelenburg.  No tenderness with resisted abduction.  No weakness to flexion, extension, abduction or adduction.  No evidence of instability.  No motor or sensory deficits.  2+ DP and PT pulses.  Skin is intact.  No scars, rashes or lesions. \par   [de-identified] : Ambulating with a slightly antalgic to antalgic gait.  Station:  Normal.  [de-identified] : Appearance:  Well-developed, well-nourished female in no acute distress.\par

## 2021-07-22 NOTE — ADDENDUM
[FreeTextEntry1] : This note was written by America Torres on 07/22/2021 acting as a scribe for THIEN MACHUCA III, MD

## 2021-08-02 ENCOUNTER — APPOINTMENT (OUTPATIENT)
Dept: OBGYN | Facility: CLINIC | Age: 56
End: 2021-08-02

## 2021-09-02 ENCOUNTER — APPOINTMENT (OUTPATIENT)
Dept: ORTHOPEDIC SURGERY | Facility: CLINIC | Age: 56
End: 2021-09-02
Payer: COMMERCIAL

## 2021-09-02 VITALS
SYSTOLIC BLOOD PRESSURE: 122 MMHG | DIASTOLIC BLOOD PRESSURE: 77 MMHG | WEIGHT: 145 LBS | HEART RATE: 71 BPM | BODY MASS INDEX: 28.47 KG/M2 | HEIGHT: 60 IN

## 2021-09-02 PROCEDURE — 73502 X-RAY EXAM HIP UNI 2-3 VIEWS: CPT | Mod: LT

## 2021-09-02 PROCEDURE — 99213 OFFICE O/P EST LOW 20 MIN: CPT

## 2021-09-08 ENCOUNTER — APPOINTMENT (OUTPATIENT)
Dept: OBGYN | Facility: CLINIC | Age: 56
End: 2021-09-08
Payer: COMMERCIAL

## 2021-09-08 VITALS
RESPIRATION RATE: 16 BRPM | SYSTOLIC BLOOD PRESSURE: 120 MMHG | WEIGHT: 140 LBS | BODY MASS INDEX: 27.48 KG/M2 | HEIGHT: 60 IN | DIASTOLIC BLOOD PRESSURE: 70 MMHG

## 2021-09-08 DIAGNOSIS — R92.2 INCONCLUSIVE MAMMOGRAM: ICD-10-CM

## 2021-09-08 DIAGNOSIS — M85.80 OTHER SPECIFIED DISORDERS OF BONE DENSITY AND STRUCTURE, UNSPECIFIED SITE: ICD-10-CM

## 2021-09-08 DIAGNOSIS — Z12.4 ENCOUNTER FOR SCREENING FOR MALIGNANT NEOPLASM OF CERVIX: ICD-10-CM

## 2021-09-08 DIAGNOSIS — Z01.419 ENCOUNTER FOR GYNECOLOGICAL EXAMINATION (GENERAL) (ROUTINE) W/OUT ABNORMAL FINDINGS: ICD-10-CM

## 2021-09-08 DIAGNOSIS — Z12.39 ENCOUNTER FOR OTHER SCREENING FOR MALIGNANT NEOPLASM OF BREAST: ICD-10-CM

## 2021-09-08 PROCEDURE — 99396 PREV VISIT EST AGE 40-64: CPT

## 2021-09-08 RX ORDER — METHYLPREDNISOLONE 4 MG/1
4 TABLET ORAL
Qty: 21 | Refills: 0 | Status: DISCONTINUED | COMMUNITY
Start: 2021-08-04

## 2021-09-08 RX ORDER — MELOXICAM 15 MG/1
15 TABLET ORAL
Qty: 20 | Refills: 1 | Status: DISCONTINUED | COMMUNITY
Start: 2020-11-10 | End: 2021-09-08

## 2021-09-08 RX ORDER — ESTRADIOL AND NORETHINDRONE ACETATE .5; .1 MG/1; MG/1
0.5-0.1 TABLET, FILM COATED ORAL
Qty: 1 | Refills: 0 | Status: DISCONTINUED | COMMUNITY
Start: 2021-07-07 | End: 2021-09-08

## 2021-09-08 RX ORDER — ZOLPIDEM TARTRATE 10 MG/1
10 TABLET ORAL
Qty: 30 | Refills: 0 | Status: DISCONTINUED | COMMUNITY
Start: 2021-09-02

## 2021-09-08 RX ORDER — ESTRADIOL AND NORETHINDRONE ACETATE .5; .1 MG/1; MG/1
0.5-0.1 TABLET, FILM COATED ORAL DAILY
Qty: 1 | Refills: 0 | Status: DISCONTINUED | COMMUNITY
Start: 2021-02-01 | End: 2021-09-08

## 2021-09-08 RX ORDER — CONJUGATED ESTROGENS 0.62 MG/G
0.62 CREAM VAGINAL
Qty: 1 | Refills: 3 | Status: ACTIVE | COMMUNITY
Start: 2019-11-11 | End: 1900-01-01

## 2021-09-08 RX ORDER — BUPROPION HYDROCHLORIDE 150 MG/1
150 TABLET, EXTENDED RELEASE ORAL
Qty: 30 | Refills: 0 | Status: ACTIVE | COMMUNITY
Start: 2021-09-02

## 2021-09-08 NOTE — PHYSICAL EXAM
[Appropriately responsive] : appropriately responsive [Alert] : alert [No Acute Distress] : no acute distress [No Lymphadenopathy] : no lymphadenopathy [Oriented x3] : oriented x3 [Examination Of The Breasts] : a normal appearance [No Masses] : no breast masses were palpable [No Discharge] : no discharge [Labia Majora] : normal [Labia Minora] : normal [Normal] : normal [Uterine Adnexae] : normal

## 2021-09-08 NOTE — DISCUSSION/SUMMARY
[FreeTextEntry1] : 1) pap performed\par 2) Rx for screening mammo & sono issued\par 3) Rx for DEXA issued\par 4) pt desires to continue with HRT: refill for Premarin and Mimvey utilized\par \par Return to office in 6 months for HRT surveillance

## 2021-09-08 NOTE — HISTORY OF PRESENT ILLNESS
[Currently In Menopause] : currently in menopause [TextBox_4] : Ro is a 56 y/o  who presents today for an annual exam with request for refills of HRT. She was on Duavee but switched to Mimvey and is also using Premarin.\par .\par She has a history of dense breast tissue.\par \par 2019 DEXA noted osteopenia.\par \par She recently started a new job as a  [BoneDensityDate] : 2019 [ColonoscopyDate] : 2021

## 2021-09-09 LAB — HPV HIGH+LOW RISK DNA PNL CVX: NOT DETECTED

## 2021-09-16 NOTE — PHYSICAL EXAM
[de-identified] : Right Hip:\par Hip: Range of Motion in Degrees:\par 	                                 Claimant:	   Normal:	\par Flexion (Active) 	                 120 	   120-degrees	\par Flexion (Passive)	                 120	   120-degrees	\par Extension (Active)	                 -30	   -30-degrees	\par Extension (Passive)	 -30	   -30-degrees	\par Abduction (Active)	                 45-50	   12-67-yzgmhyg	\par Abduction (Passive)	 45-50	   10-92-namkffy	\par Adduction (Active)  	 20-30	   05-62-ptcihdi	\par Adduction (Passive)	 20-30	   78-14-mimtieo	\par Internal Rotation (Active) 	 35	   35-degrees	\par Internal Rotation (Passive)	 35	   35-degrees	\par External Rotation (Active)	 45	   45-degrees	\par External Rotation (Passive)	 45	   45-degrees	\par \par No tenderness with internal or external rotation or axial load.  No tenderness to palpation over the greater trochanter.  Negative Trendelenburg.  No tenderness with resisted abduction.  No weakness to flexion, extension, abduction or adduction.  No evidence of instability.  No motor or sensory deficits.  2+ DP and PT pulses.  Well-healed scar.\par \par Left Hip:\par Hip: Range of Motion in Degrees:\par 	                                  Claimant:	Normal:	\par Flexion (Active) 	                  120 	                120-degrees	\par Flexion (Passive)	                  120	                120-degrees	\par Extension (Active)	                  -30	                -30-degrees	\par Extension (Passive)	  -30	                -30-degrees	\par Abduction (Active)	                  45-50	                46-72-hcqrijo	\par Abduction (Passive)	  45-50	                31-58-aukmklk	\par Adduction (Active)	                  20-30	                79-80-wekmagz	\par Adduction (Passive)	  20-30	                80-14-khwvivu	\par Internal Rotation (Active) 	 35	                35-degrees	\par Internal Rotation (Passive)	 35	                35-degrees	\par External Rotation (Active)	 45	                45-degrees	\par External Rotation (Passive)	 45	                45-degrees	\par \par Tenderness into the groin with internal and external rotation and axial load.  No tenderness to palpation over the greater trochanter.  Negative Trendelenburg.  No tenderness with resisted abduction.  No weakness to flexion, extension, abduction or adduction.  No evidence of instability.  No motor or sensory deficits.  2+ DP and PT pulses.  Skin is intact.  No scars, rashes or lesions.  \par   [de-identified] : Gait and Station:  Ambulating with a slightly antalgic to antalgic gait.  Station:  Normal.  [de-identified] : Appearance:  Well-developed, well-nourished female in no acute distress.\par   [de-identified] : Radiographs taken today, two to three views of the left hip with pelvis, show significant progression of her arthritis.  MRI of the left hip shows a small area of AVN in the head.

## 2021-09-16 NOTE — HISTORY OF PRESENT ILLNESS
[de-identified] : The patient comes in today stating her groin is doing much better, but she developed significant pain to her left hip.  She had an MRI of the left hip and was told she has a small area of osteonecrosis.

## 2021-09-16 NOTE — DISCUSSION/SUMMARY
[de-identified] : At this time, due to osteoarthritis with an area of AVN of the left hip, I recommend the patient undergo physical therapy, anti-inflammatories and reassessment in 3-4 weeks to discuss the potential for further intervention, including total hip arthroplasty.

## 2021-09-16 NOTE — ADDENDUM
[FreeTextEntry1] : This note was written by Heather Bell on 09/08/2021 acting as scribe for Jay Rowland III, MD

## 2021-09-21 ENCOUNTER — APPOINTMENT (OUTPATIENT)
Dept: ORTHOPEDIC SURGERY | Facility: CLINIC | Age: 56
End: 2021-09-21
Payer: COMMERCIAL

## 2021-09-21 DIAGNOSIS — M18.0 BILATERAL PRIMARY OSTEOARTHRITIS OF FIRST CARPOMETACARPAL JOINTS: ICD-10-CM

## 2021-09-21 PROCEDURE — 20600 DRAIN/INJ JOINT/BURSA W/O US: CPT | Mod: 50

## 2021-09-21 PROCEDURE — 99213 OFFICE O/P EST LOW 20 MIN: CPT | Mod: 25

## 2021-09-21 NOTE — PHYSICAL EXAM
[Normal RUE] : Right Upper Extremity: No scars, rashes, lesions, ulcers, skin intact [Normal LUE] : Left Upper Extremity: No scars, rashes, lesions, ulcers, skin intact [Normal Finger/nose] : finger to nose coordination [Normal] : no peripheral adenopathy appreciated [de-identified] : There is tenderness over the bilateral STT joint with a positive grind test and shoulder deformity. There is a positive crank test. There is swelling appreciated over the affected STT joint bilaterally. There is no evidence of infection or lymphadenopathy bilaterally to the level of the elbows There is no evidence of MCP hyperextension bilaterally. There is a negative Finkelstein's test There is no tenderness over the A-1 pulleys bilaterally. 5/5 strength bilaterally. \par \par The wrists have a symmetric and full range of motion bilaterally with no pain upon forced flexion, extension, pronation and supination. There is no tenderness over the scaphoid scapholunate region ligaments and no tenderness of the TFCC bilaterally. There is no tenderness of the pisotriquetral hamate hook. The second through fifth CMC his is stable and nontender bilaterally.\par There is a negative carpal tunnel compression test or Tinel's bilaterally.   [de-identified] : abhijitr [de-identified] : 3 x-ray views of the bilateral hands taken last visit reviewed again today reveal advanced STT arthritis on the right, mild to moderate STT arthritis on the left

## 2021-09-21 NOTE — PROCEDURE
[FreeTextEntry1] : My impression is that the patient has STT joint osteoarthritis. We discussed treatment options and she elected to undergo a basal joint injection. The risks, benefits, and alternatives were discussed with the patient. This included, but was not limited to nerve injury, infection, subcutaneous atrophy, skin depigmentation etc. Under informed consent and sterile conditions, the right base of the thumb was anesthetized with 1% lidocaine. The BILATERAL STT joint was then injected with 1cc of 40mg depo. It is my hopes that this significantly alleviates the patients symptoms.\par

## 2021-09-21 NOTE — ASSESSMENT
[FreeTextEntry1] :  55-year-old female with bilateral STT arthritis.\par The natural history and treatment of degenerative arthritis was discussed with the patient at length today. The spectrum of treatment including nonoperative modalities to surgical intervention was elucidated. A discussion about future surgical intervention was had today, but at this time the patient would like to remain conservative. \par \par I recommend continued conservative treatment this time with comfort thumb immobilizers and a activity modifications.  She received bilateral steroid injections today and tolerated the procedures well. She will follow up as needed for repeat injections vs surgical intervention planning. All questions answered.

## 2021-09-21 NOTE — HISTORY OF PRESENT ILLNESS
[FreeTextEntry1] : a 55-year-old female presents for evaluation of pain at the base of bilateral thumbs right worse than left. Her symptoms have been present for approximately 2 years she denies trauma or inciting event. She has no aching pain at the base of the thumb with a circumferential radiation. 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 She notes her symptoms are worse on the right thumb than the left. She has recently received a steroid injection to her left thumb with Dr. Rowland, and notes some improvement following. She reports worsening of her right thumb pain, which is limiting her activities during the day. She reports pain at rest as well. She would like to consider a cortisone injection to the right thumb today. 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 She reports her pain has been worsening over the last couple of months, with now her left thumb more painful than the right. She reports she has had good benefit from the injections in the past, and would like to proceed with a bilateral STT joint steroid injection today. \par

## 2021-09-30 ENCOUNTER — APPOINTMENT (OUTPATIENT)
Dept: ORTHOPEDIC SURGERY | Facility: CLINIC | Age: 56
End: 2021-09-30
Payer: COMMERCIAL

## 2021-09-30 VITALS
BODY MASS INDEX: 27.48 KG/M2 | DIASTOLIC BLOOD PRESSURE: 71 MMHG | HEART RATE: 81 BPM | SYSTOLIC BLOOD PRESSURE: 112 MMHG | HEIGHT: 60 IN | WEIGHT: 140 LBS

## 2021-09-30 PROCEDURE — 73502 X-RAY EXAM HIP UNI 2-3 VIEWS: CPT | Mod: LT

## 2021-09-30 PROCEDURE — 99213 OFFICE O/P EST LOW 20 MIN: CPT

## 2021-10-05 NOTE — ADDENDUM
[FreeTextEntry1] : This note was written by Nazario Gomez on 10/05/2021, acting as a scribe for Jay Rowland III, MD

## 2021-10-05 NOTE — DISCUSSION/SUMMARY
[de-identified] : At this time, due to AVN of the left hip, the patient was instructed on home therapeutic modalities and will follow up with me on an as needed basis.\par

## 2021-10-05 NOTE — PHYSICAL EXAM
[de-identified] : Left Hip: Range of Motion in Degrees:\par 	                                  Claimant:	Normal:	\par Flexion (Active) 	                  120 	                120-degrees	\par Flexion (Passive)	                  120	                120-degrees	\par Extension (Active)	                  -30	                -30-degrees	\par Extension (Passive)	  -30	                -30-degrees	\par Abduction (Active)	                  45-50	                23-49-rrbbehg	\par Abduction (Passive)	  45-50	                17-22-lxyspdv	\par Adduction (Active)	                  20-30	                22-46-ecosall	\par Adduction (Passive)	  20-30	                50-03-fhqpmbu	\par Internal Rotation (Active) 	 35	                35-degrees	\par Internal Rotation (Passive)	 35	                35-degrees	\par External Rotation (Active)	 45	                45-degrees	\par External Rotation (Passive)	 45	                45-degrees	\par \par Tenderness into the groin with internal and external rotation and axial load.  No tenderness to palpation over the greater trochanter.  Negative Trendelenburg.  No tenderness with resisted abduction.  No weakness to flexion, extension, abduction or adduction.  No evidence of instability.  No motor or sensory deficits.  2+ DP and PT pulses.  Skin is intact.  No scars, rashes or lesions.  \par   [de-identified] : Gait and Station:  Ambulating with a slightly antalgic to antalgic gait.  Normal Station.  [de-identified] : Appearance:  Well developed, well-nourished female in no acute distress.\par   [de-identified] : Radiographs, two to three views of the left hip and pelvis, show no interval change.\par

## 2021-10-05 NOTE — HISTORY OF PRESENT ILLNESS
[de-identified] : The patient comes in today for her left hip.  She states she is actually feeling better and the pain is down to a 2 or 3 on a pain scale of 0-10.\par

## 2021-10-25 ENCOUNTER — RX RENEWAL (OUTPATIENT)
Age: 56
End: 2021-10-25

## 2021-10-25 RX ORDER — ESTRADIOL AND NORETHINDRONE ACETATE 1; .5 MG/1; MG/1
1-0.5 TABLET, FILM COATED ORAL DAILY
Qty: 3 | Refills: 1 | Status: ACTIVE | COMMUNITY
Start: 2020-08-21 | End: 1900-01-01

## 2021-12-08 ENCOUNTER — NON-APPOINTMENT (OUTPATIENT)
Age: 56
End: 2021-12-08

## 2021-12-08 ENCOUNTER — APPOINTMENT (OUTPATIENT)
Dept: MAMMOGRAPHY | Facility: CLINIC | Age: 56
End: 2021-12-08
Payer: COMMERCIAL

## 2021-12-08 ENCOUNTER — RESULT REVIEW (OUTPATIENT)
Age: 56
End: 2021-12-08

## 2021-12-08 ENCOUNTER — APPOINTMENT (OUTPATIENT)
Dept: ULTRASOUND IMAGING | Facility: CLINIC | Age: 56
End: 2021-12-08
Payer: COMMERCIAL

## 2021-12-08 ENCOUNTER — APPOINTMENT (OUTPATIENT)
Dept: RADIOLOGY | Facility: CLINIC | Age: 56
End: 2021-12-08
Payer: COMMERCIAL

## 2021-12-08 ENCOUNTER — OUTPATIENT (OUTPATIENT)
Dept: OUTPATIENT SERVICES | Facility: HOSPITAL | Age: 56
LOS: 1 days | End: 2021-12-08
Payer: COMMERCIAL

## 2021-12-08 DIAGNOSIS — N60.01 SOLITARY CYST OF RIGHT BREAST: ICD-10-CM

## 2021-12-08 DIAGNOSIS — Z12.39 ENCOUNTER FOR OTHER SCREENING FOR MALIGNANT NEOPLASM OF BREAST: ICD-10-CM

## 2021-12-08 DIAGNOSIS — R92.2 INCONCLUSIVE MAMMOGRAM: ICD-10-CM

## 2021-12-08 DIAGNOSIS — Z96.641 PRESENCE OF RIGHT ARTIFICIAL HIP JOINT: Chronic | ICD-10-CM

## 2021-12-08 DIAGNOSIS — M85.80 OTHER SPECIFIED DISORDERS OF BONE DENSITY AND STRUCTURE, UNSPECIFIED SITE: ICD-10-CM

## 2021-12-08 PROCEDURE — 77063 BREAST TOMOSYNTHESIS BI: CPT | Mod: 26

## 2021-12-08 PROCEDURE — 77085 DXA BONE DENSITY AXL VRT FX: CPT

## 2021-12-08 PROCEDURE — 77063 BREAST TOMOSYNTHESIS BI: CPT

## 2021-12-08 PROCEDURE — 76641 ULTRASOUND BREAST COMPLETE: CPT

## 2021-12-08 PROCEDURE — 77085 DXA BONE DENSITY AXL VRT FX: CPT | Mod: 26

## 2021-12-08 PROCEDURE — 77067 SCR MAMMO BI INCL CAD: CPT | Mod: 26

## 2021-12-08 PROCEDURE — 76641 ULTRASOUND BREAST COMPLETE: CPT | Mod: 26,50

## 2021-12-08 PROCEDURE — 77067 SCR MAMMO BI INCL CAD: CPT

## 2021-12-11 ENCOUNTER — RESULT REVIEW (OUTPATIENT)
Age: 56
End: 2021-12-11

## 2021-12-11 ENCOUNTER — APPOINTMENT (OUTPATIENT)
Dept: ULTRASOUND IMAGING | Facility: CLINIC | Age: 56
End: 2021-12-11
Payer: COMMERCIAL

## 2021-12-11 ENCOUNTER — OUTPATIENT (OUTPATIENT)
Dept: OUTPATIENT SERVICES | Facility: HOSPITAL | Age: 56
LOS: 1 days | End: 2021-12-11
Payer: COMMERCIAL

## 2021-12-11 DIAGNOSIS — Z00.8 ENCOUNTER FOR OTHER GENERAL EXAMINATION: ICD-10-CM

## 2021-12-11 DIAGNOSIS — Z96.641 PRESENCE OF RIGHT ARTIFICIAL HIP JOINT: Chronic | ICD-10-CM

## 2021-12-11 PROCEDURE — 76642 ULTRASOUND BREAST LIMITED: CPT

## 2021-12-11 PROCEDURE — 76642 ULTRASOUND BREAST LIMITED: CPT | Mod: 26,RT

## 2021-12-13 ENCOUNTER — NON-APPOINTMENT (OUTPATIENT)
Age: 56
End: 2021-12-13

## 2021-12-13 DIAGNOSIS — N60.19 DIFFUSE CYSTIC MASTOPATHY OF UNSPECIFIED BREAST: ICD-10-CM

## 2021-12-20 ENCOUNTER — APPOINTMENT (OUTPATIENT)
Dept: ORTHOPEDIC SURGERY | Facility: CLINIC | Age: 56
End: 2021-12-20
Payer: COMMERCIAL

## 2021-12-20 VITALS
SYSTOLIC BLOOD PRESSURE: 107 MMHG | HEIGHT: 60 IN | DIASTOLIC BLOOD PRESSURE: 68 MMHG | BODY MASS INDEX: 27.48 KG/M2 | WEIGHT: 140 LBS | HEART RATE: 70 BPM

## 2021-12-20 DIAGNOSIS — M87.00 IDIOPATHIC ASEPTIC NECROSIS OF UNSPECIFIED BONE: ICD-10-CM

## 2021-12-20 PROCEDURE — 99213 OFFICE O/P EST LOW 20 MIN: CPT

## 2021-12-20 PROCEDURE — 73502 X-RAY EXAM HIP UNI 2-3 VIEWS: CPT | Mod: LT

## 2021-12-22 NOTE — ADDENDUM
[FreeTextEntry1] : This note was written by Nazario Gomez on 12/22/2021, acting as a scribe for Jay Rowland III, MD

## 2021-12-22 NOTE — DISCUSSION/SUMMARY
[de-identified] : At this time, due to AVN of the left hip, I recommended left total hip arthroplasty.  All the risks and benefits of the surgery, including, but not limited to, anesthesia, infection, nerve and/or vascular injury, need for further surgery, DVT, PE, perioperative death and limb length inequality, were all discussed with the patient at length.  In light of these, the patient wishes to proceed.  The patient will be scheduled at this time. \par \par I reviewed the plan of care, as well as a model of a total hip implant equivalent to the one that will be used for their total hip joint replacement.\par \par The patient agreed to the plan of care, as well as the use of implants for their hip total joint replacement.\par \par \par \par \par \par

## 2021-12-22 NOTE — HISTORY OF PRESENT ILLNESS
[de-identified] : The patient comes in today with progressive complaints of pain to her left hip with difficulty ambulating at this point.  \par

## 2021-12-22 NOTE — PHYSICAL EXAM
[de-identified] : Right Hip: Range of Motion in Degrees:\par 	                                 Claimant:	   Normal:	\par Flexion (Active) 	                 120 	   120-degrees	\par Flexion (Passive)	                 120	   120-degrees	\par Extension (Active)	                 -30	   -30-degrees	\par Extension (Passive)	 -30	   -30-degrees	\par Abduction (Active)	                 45-50	   75-83-wgbtnyb	\par Abduction (Passive)	 45-50	   13-14-aubrebh	\par Adduction (Active)  	 20-30	   97-17-nfwlrlk	\par Adduction (Passive)	 20-30	   53-77-ejiatfn	\par Internal Rotation (Active) 	 35	   35-degrees	\par Internal Rotation (Passive)	 35	   35-degrees	\par External Rotation (Active)	 45	   45-degrees	\par External Rotation (Passive)	 45	   45-degrees	\par \par No tenderness with internal or external rotation or axial load.  No tenderness to palpation over the greater trochanter.  Negative Trendelenburg.  No tenderness with resisted abduction.  No weakness to flexion, extension, abduction or adduction.  No evidence of instability.  No motor or sensory deficits.  2+ DP and PT pulses.  Well-healed scar.\par \par Left Hip: Range of Motion in Degrees:\par 	                                  Claimant:	Normal:	\par Flexion (Active) 	                  120 	                120-degrees	\par Flexion (Passive)	                  120	                120-degrees	\par Extension (Active)	                  -30	                -30-degrees	\par Extension (Passive)	  -30	                -30-degrees	\par Abduction (Active)	                  45-50	                76-36-rbzegps	\par Abduction (Passive)	  45-50	                60-23-pbitafp	\par Adduction (Active)	                  20-30	                26-56-oyekfzy	\par Adduction (Passive)	  20-30	                40-83-kpwlzvx	\par Internal Rotation (Active) 	 35	                35-degrees	\par Internal Rotation (Passive)	 35	                35-degrees	\par External Rotation (Active)	 45	                45-degrees	\par External Rotation (Passive)	 45	                45-degrees	\par \par Tenderness into the groin with internal and external rotation and axial load.  No tenderness to palpation over the greater trochanter.  Negative Trendelenburg.  No tenderness with resisted abduction.  No weakness to flexion, extension, abduction or adduction.  No evidence of instability.  No motor or sensory deficits.  2+ DP and PT pulses.  Skin is intact.  No scars, rashes or lesions.  \par  \par   [de-identified] : Gait and Station:  Ambulating with a slightly antalgic to antalgic gait.  Normal Station.  [de-identified] : Appearance:  Well developed, well-nourished female in no acute distress.\par   [de-identified] : Radiographs, two to three views of the left hip and pelvis, show AVN with early collapse.

## 2022-01-28 ENCOUNTER — APPOINTMENT (OUTPATIENT)
Dept: ORTHOPEDIC SURGERY | Facility: CLINIC | Age: 57
End: 2022-01-28
Payer: COMMERCIAL

## 2022-01-28 DIAGNOSIS — M18.12 UNILATERAL PRIMARY OSTEOARTHRITIS OF FIRST CARPOMETACARPAL JOINT, LEFT HAND: ICD-10-CM

## 2022-01-28 PROCEDURE — 99214 OFFICE O/P EST MOD 30 MIN: CPT | Mod: 25

## 2022-01-28 PROCEDURE — 20600 DRAIN/INJ JOINT/BURSA W/O US: CPT | Mod: 50

## 2022-01-28 NOTE — HISTORY OF PRESENT ILLNESS
[FreeTextEntry1] : a 55-year-old female presents for evaluation of pain at the base of bilateral thumbs right worse than left. Her symptoms have been present for approximately 2 years she denies trauma or inciting event. She has no aching pain at the base of the thumb with a circumferential radiation. 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 She notes her symptoms are worse on the right thumb than the left. She has recently received a steroid injection to her left thumb with Dr. Rowland, and notes some improvement following. She reports worsening of her right thumb pain, which is limiting her activities during the day. She reports pain at rest as well. She would like to consider a cortisone injection to the right thumb today. 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 She reports her pain has been worsening over the last couple of months, with now her left thumb more painful than the right. She reports she has had good benefit from the injections in the past, and would like to proceed with a bilateral STT joint steroid injection today. \par \par 01/28/2022: Patient returns for reevaluation of bilateral thumb/wrist pain 2/2 STT arthritis. She reports return of her pain following a bilateral steroid injection for about 1-2 weeks. She notes her symptoms are severe in nature. She would like to consider a repeat steroid injection as she notes she responds well.

## 2022-01-28 NOTE — PHYSICAL EXAM
[Normal RUE] : Right Upper Extremity: No scars, rashes, lesions, ulcers, skin intact [Normal LUE] : Left Upper Extremity: No scars, rashes, lesions, ulcers, skin intact [Normal Finger/nose] : finger to nose coordination [Normal] : no peripheral adenopathy appreciated [de-identified] : There is tenderness over the bilateral STT joint with a positive grind test and shoulder deformity. There is a positive crank test. There is swelling appreciated over the affected STT joint bilaterally. There is no evidence of infection or lymphadenopathy bilaterally to the level of the elbows There is no evidence of MCP hyperextension bilaterally. There is a negative Finkelstein's test There is no tenderness over the A-1 pulleys bilaterally. 5/5 strength bilaterally. \par \par The wrists have a symmetric and full range of motion bilaterally with no pain upon forced flexion, extension, pronation and supination. There is no tenderness over the scaphoid scapholunate region ligaments and no tenderness of the TFCC bilaterally. There is no tenderness of the pisotriquetral hamate hook. The second through fifth CMC his is stable and nontender bilaterally.\par There is a negative carpal tunnel compression test or Tinel's bilaterally.   [de-identified] : abhijitr [de-identified] : 3 x-ray views of the bilateral hands taken last visit reviewed again today reveal advanced STT arthritis on the right, mild to moderate STT arthritis on the left

## 2022-02-09 ENCOUNTER — RESULT REVIEW (OUTPATIENT)
Age: 57
End: 2022-02-09

## 2022-02-09 ENCOUNTER — OUTPATIENT (OUTPATIENT)
Dept: OUTPATIENT SERVICES | Facility: HOSPITAL | Age: 57
LOS: 1 days | End: 2022-02-09
Payer: COMMERCIAL

## 2022-02-09 VITALS
OXYGEN SATURATION: 100 % | RESPIRATION RATE: 16 BRPM | WEIGHT: 141.98 LBS | SYSTOLIC BLOOD PRESSURE: 126 MMHG | HEIGHT: 62 IN | TEMPERATURE: 98 F | DIASTOLIC BLOOD PRESSURE: 73 MMHG | HEART RATE: 62 BPM

## 2022-02-09 DIAGNOSIS — Z98.890 OTHER SPECIFIED POSTPROCEDURAL STATES: Chronic | ICD-10-CM

## 2022-02-09 DIAGNOSIS — M87.00 IDIOPATHIC ASEPTIC NECROSIS OF UNSPECIFIED BONE: ICD-10-CM

## 2022-02-09 DIAGNOSIS — Z29.9 ENCOUNTER FOR PROPHYLACTIC MEASURES, UNSPECIFIED: ICD-10-CM

## 2022-02-09 DIAGNOSIS — Z96.641 PRESENCE OF RIGHT ARTIFICIAL HIP JOINT: Chronic | ICD-10-CM

## 2022-02-09 DIAGNOSIS — Z01.818 ENCOUNTER FOR OTHER PREPROCEDURAL EXAMINATION: ICD-10-CM

## 2022-02-09 LAB
A1C WITH ESTIMATED AVERAGE GLUCOSE RESULT: 5.6 % — SIGNIFICANT CHANGE UP (ref 4–5.6)
ALBUMIN SERPL ELPH-MCNC: 3.9 G/DL — SIGNIFICANT CHANGE UP (ref 3.3–5)
ANION GAP SERPL CALC-SCNC: 4 MMOL/L — LOW (ref 5–17)
APTT BLD: 31.1 SEC — SIGNIFICANT CHANGE UP (ref 27.5–35.5)
BASOPHILS # BLD AUTO: 0.04 K/UL — SIGNIFICANT CHANGE UP (ref 0–0.2)
BASOPHILS NFR BLD AUTO: 0.7 % — SIGNIFICANT CHANGE UP (ref 0–2)
BUN SERPL-MCNC: 19 MG/DL — SIGNIFICANT CHANGE UP (ref 7–23)
CALCIUM SERPL-MCNC: 9.4 MG/DL — SIGNIFICANT CHANGE UP (ref 8.5–10.1)
CHLORIDE SERPL-SCNC: 106 MMOL/L — SIGNIFICANT CHANGE UP (ref 96–108)
CO2 SERPL-SCNC: 29 MMOL/L — SIGNIFICANT CHANGE UP (ref 22–31)
CREAT SERPL-MCNC: 0.6 MG/DL — SIGNIFICANT CHANGE UP (ref 0.5–1.3)
EOSINOPHIL # BLD AUTO: 0.19 K/UL — SIGNIFICANT CHANGE UP (ref 0–0.5)
EOSINOPHIL NFR BLD AUTO: 3.4 % — SIGNIFICANT CHANGE UP (ref 0–6)
ESTIMATED AVERAGE GLUCOSE: 114 MG/DL — SIGNIFICANT CHANGE UP (ref 68–114)
GLUCOSE SERPL-MCNC: 100 MG/DL — HIGH (ref 70–99)
HCT VFR BLD CALC: 41 % — SIGNIFICANT CHANGE UP (ref 34.5–45)
HGB BLD-MCNC: 13.2 G/DL — SIGNIFICANT CHANGE UP (ref 11.5–15.5)
IMM GRANULOCYTES NFR BLD AUTO: 0.2 % — SIGNIFICANT CHANGE UP (ref 0–1.5)
INR BLD: 0.99 RATIO — SIGNIFICANT CHANGE UP (ref 0.88–1.16)
LYMPHOCYTES # BLD AUTO: 1.65 K/UL — SIGNIFICANT CHANGE UP (ref 1–3.3)
LYMPHOCYTES # BLD AUTO: 29.7 % — SIGNIFICANT CHANGE UP (ref 13–44)
MCHC RBC-ENTMCNC: 31.4 PG — SIGNIFICANT CHANGE UP (ref 27–34)
MCHC RBC-ENTMCNC: 32.2 GM/DL — SIGNIFICANT CHANGE UP (ref 32–36)
MCV RBC AUTO: 97.4 FL — SIGNIFICANT CHANGE UP (ref 80–100)
MONOCYTES # BLD AUTO: 0.38 K/UL — SIGNIFICANT CHANGE UP (ref 0–0.9)
MONOCYTES NFR BLD AUTO: 6.8 % — SIGNIFICANT CHANGE UP (ref 2–14)
MRSA PCR RESULT.: SIGNIFICANT CHANGE UP
NEUTROPHILS # BLD AUTO: 3.29 K/UL — SIGNIFICANT CHANGE UP (ref 1.8–7.4)
NEUTROPHILS NFR BLD AUTO: 59.2 % — SIGNIFICANT CHANGE UP (ref 43–77)
PLATELET # BLD AUTO: 282 K/UL — SIGNIFICANT CHANGE UP (ref 150–400)
POTASSIUM SERPL-MCNC: 4.4 MMOL/L — SIGNIFICANT CHANGE UP (ref 3.5–5.3)
POTASSIUM SERPL-SCNC: 4.4 MMOL/L — SIGNIFICANT CHANGE UP (ref 3.5–5.3)
PROTHROM AB SERPL-ACNC: 11.5 SEC — SIGNIFICANT CHANGE UP (ref 10.6–13.6)
RBC # BLD: 4.21 M/UL — SIGNIFICANT CHANGE UP (ref 3.8–5.2)
RBC # FLD: 12.8 % — SIGNIFICANT CHANGE UP (ref 10.3–14.5)
S AUREUS DNA NOSE QL NAA+PROBE: SIGNIFICANT CHANGE UP
SODIUM SERPL-SCNC: 139 MMOL/L — SIGNIFICANT CHANGE UP (ref 135–145)
WBC # BLD: 5.56 K/UL — SIGNIFICANT CHANGE UP (ref 3.8–10.5)
WBC # FLD AUTO: 5.56 K/UL — SIGNIFICANT CHANGE UP (ref 3.8–10.5)

## 2022-02-09 PROCEDURE — 85025 COMPLETE CBC W/AUTO DIFF WBC: CPT

## 2022-02-09 PROCEDURE — 73502 X-RAY EXAM HIP UNI 2-3 VIEWS: CPT | Mod: LT

## 2022-02-09 PROCEDURE — 71046 X-RAY EXAM CHEST 2 VIEWS: CPT

## 2022-02-09 PROCEDURE — G0463: CPT | Mod: 25

## 2022-02-09 PROCEDURE — 87640 STAPH A DNA AMP PROBE: CPT

## 2022-02-09 PROCEDURE — 71046 X-RAY EXAM CHEST 2 VIEWS: CPT | Mod: 26

## 2022-02-09 PROCEDURE — 82040 ASSAY OF SERUM ALBUMIN: CPT

## 2022-02-09 PROCEDURE — 80048 BASIC METABOLIC PNL TOTAL CA: CPT

## 2022-02-09 PROCEDURE — 93005 ELECTROCARDIOGRAM TRACING: CPT

## 2022-02-09 PROCEDURE — 87641 MR-STAPH DNA AMP PROBE: CPT

## 2022-02-09 PROCEDURE — 83036 HEMOGLOBIN GLYCOSYLATED A1C: CPT

## 2022-02-09 PROCEDURE — 73502 X-RAY EXAM HIP UNI 2-3 VIEWS: CPT | Mod: 26,LT

## 2022-02-09 PROCEDURE — 36415 COLL VENOUS BLD VENIPUNCTURE: CPT

## 2022-02-09 PROCEDURE — 85610 PROTHROMBIN TIME: CPT

## 2022-02-09 PROCEDURE — 86901 BLOOD TYPING SEROLOGIC RH(D): CPT

## 2022-02-09 PROCEDURE — 85730 THROMBOPLASTIN TIME PARTIAL: CPT

## 2022-02-09 PROCEDURE — 93010 ELECTROCARDIOGRAM REPORT: CPT

## 2022-02-09 PROCEDURE — 86900 BLOOD TYPING SEROLOGIC ABO: CPT

## 2022-02-09 PROCEDURE — 86850 RBC ANTIBODY SCREEN: CPT

## 2022-02-09 RX ORDER — ESCITALOPRAM OXALATE 10 MG/1
1 TABLET, FILM COATED ORAL
Qty: 0 | Refills: 0 | DISCHARGE

## 2022-02-09 NOTE — H&P PST ADULT - ASSESSMENT
56 years old female present to PST prior to left total hip replacement with Dr. Rowland.    Plan   1. NPO as per ASU  2. To schedule Covid swab for 2022  3. Use E-Z sponge as directed  4. Use Mupirocin as directed.  5. Drink a quart of extra  fluids the day before your surgery.  6. Medical optimization for surgery with Dr. Diane  7. CBC, BMP, HGB AIC, Albumin, PT/ INR and PTT, Type and Screen, MRSA sent to lab  8. EKG, Left hip/ Pelvis and Chest x- ray done in [facility]      CAPRINI SCORE [CLOT]    AGE RELATED RISK FACTORS                                                       MOBILITY RELATED FACTORS  [x ] Age 41-60 years                                            (1 Point)                  [ ] Bed rest                                                        (1 Point)  [ ] Age: 61-74 years                                           (2 Points)                 [ ] Plaster cast                                                   (2 Points)  [ ] Age= 75 years                                              (3 Points)                 [ ] Bed bound for more than 72 hours                 (2 Points)    DISEASE RELATED RISK FACTORS                                               GENDER SPECIFIC FACTORS  [ ] Edema in the lower extremities                       (1 Point)                  [ ] Pregnancy                                                     (1 Point)  [ ] Varicose veins                                               (1 Point)                  [ ] Post-partum < 6 weeks                                   (1 Point)             [x ] BMI > 25 Kg/m2                                            (1 Point)                  [ ] Hormonal therapy  or oral contraception          (1 Point)                 [ ] Sepsis (in the previous month)                        (1 Point)                  [ ] History of pregnancy complications                 (1 point)  [ ] Pneumonia or serious lung disease                                               [ ] Unexplained or recurrent                     (1 Point)           (in the previous month)                               (1 Point)  [ ] Abnormal pulmonary function test                     (1 Point)                 SURGERY RELATED RISK FACTORS  [ ] Acute myocardial infarction                              (1 Point)                 [ ]  Section                                             (1 Point)  [ ] Congestive heart failure (in the previous month)  (1 Point)               [ ] Minor surgery                                                  (1 Point)   [ ] Inflammatory bowel disease                             (1 Point)                 [ ] Arthroscopic surgery                                        (2 Points)  [ ] Central venous access                                      (2 Points)                [ ] General surgery lasting more than 45 minutes   (2 Points)       [ ] Stroke (in the previous month)                          (5 Points)               [ x] Elective arthroplasty                                         (5 Points)            [ ] malignancy present or previous                      (2 points)                                                                                                                                   HEMATOLOGY RELATED FACTORS                                                 TRAUMA RELATED RISK FACTORS  [ ] Prior episodes of VTE                                     (3 Points)                 [ ] Fracture of the hip, pelvis, or leg                       (5 Points)  [ ] Positive family history for VTE                         (3 Points)                 [ ] Acute spinal cord injury (in the previous month)  (5 Points)  [ ] Prothrombin 82955 A                                     (3 Points)                 [ ] Paralysis  (less than 1 month)                             (5 Points)  [ ] Factor V Leiden                                             (3 Points)                  [ ] Multiple Trauma within 1 month                        (5 Points)  [ ] Lupus anticoagulants                                     (3 Points)                                                           [ ] Anticardiolipin antibodies                               (3 Points)                                                       [ ] High homocysteine in the blood                      (3 Points)                                             [ ] Other congenital or acquired thrombophilia      (3 Points)                                                [ ] Heparin induced thrombocytopenia                  (3 Points)                                          Total Score [  7        ]

## 2022-02-09 NOTE — H&P PST ADULT - NSICDXPASTMEDICALHX_GEN_ALL_CORE_FT
PAST MEDICAL HISTORY:  Anxiety     Avascular necrosis     Breast cyst followed by gyn.    History of colon polyps     History of depression Off Wellbutrin    Osteoarthritis

## 2022-02-09 NOTE — H&P PST ADULT - HEALTH CARE MAINTENANCE
Denies travel outside of state or country in the last 14 days.   Denies contact with known Coronavirus positive person.  Denies fever, chills, cough, congestion, runny nose, SOB or difficulty breathing, fatigue, muscle or body ache, headache. loss of taste or smell, N/V/D.    Influenza vaccine 9/2021

## 2022-02-09 NOTE — H&P PST ADULT - NSICDXFAMILYHX_GEN_ALL_CORE_FT
FAMILY HISTORY:  Father  Still living? No  Family history of parkinsonism, Age at diagnosis: Age Unknown  Family history of stroke, Age at diagnosis: Age Unknown  FH: CAD (coronary artery disease), Age at diagnosis: Age Unknown    Mother  Still living? Yes, Estimated age: 71-80  Family history of osteoarthritis, Age at diagnosis: Age Unknown  Family hx of colon cancer, Age at diagnosis: Age Unknown  FH: type 2 diabetes, Age at diagnosis: Age Unknown    Sibling  Still living? Yes, Estimated age: 51-60  History of hypertension in sibling, Age at diagnosis: Age Unknown

## 2022-02-09 NOTE — H&P PST ADULT - NSICDXPASTSURGICALHX_GEN_ALL_CORE_FT
PAST SURGICAL HISTORY:  H/O inguinal hernia repair bilateral 1969    History of bunionectomy right x 2, left x 1. In 2020    History of colonoscopy with polypectomy last done Summer 2021    History of ovarian cystectomy 1998    History of total hip replacement, right 11/ 2016

## 2022-02-09 NOTE — H&P PST ADULT - HISTORY OF PRESENT ILLNESS
56 years old female with avascular necrosis. Report soft tissue injury to left hip. Injured herself on exercise machine. Soft tissue healed peg pain persist. Saw Dr. Rowland. X-rays of left hip done indicating "joint issue" and decrease cartilage She did physical therapy. Pain persist in different areas of the cyst. She went back to Dr. Rowland. Additional x-rays indicate more cartilage decrease and the development of a cyst in the joint. Planned left total hip replacement.  Right hip replaced in the past.

## 2022-02-09 NOTE — H&P PST ADULT - NSANTHOSAYNRD_GEN_A_CORE
No. MOE screening performed.  STOP BANG Legend: 0-2 = LOW Risk; 3-4 = INTERMEDIATE Risk; 5-8 = HIGH Risk

## 2022-02-09 NOTE — H&P PST ADULT - FALL HARM RISK - UNIVERSAL INTERVENTIONS
Bed in lowest position, wheels locked, appropriate side rails in place/Call bell, personal items and telephone in reach/Instruct patient to call for assistance before getting out of bed or chair/Non-slip footwear when patient is out of bed/[place] to call system/Physically safe environment - no spills, clutter or unnecessary equipment/Purposeful Proactive Rounding/Room/bathroom lighting operational, light cord in reach

## 2022-02-10 DIAGNOSIS — M87.00 IDIOPATHIC ASEPTIC NECROSIS OF UNSPECIFIED BONE: ICD-10-CM

## 2022-02-10 DIAGNOSIS — Z01.818 ENCOUNTER FOR OTHER PREPROCEDURAL EXAMINATION: ICD-10-CM

## 2022-02-18 RX ORDER — SODIUM CHLORIDE 9 MG/ML
3 INJECTION INTRAMUSCULAR; INTRAVENOUS; SUBCUTANEOUS EVERY 8 HOURS
Refills: 0 | Status: DISCONTINUED | OUTPATIENT
Start: 2022-02-22 | End: 2022-02-24

## 2022-02-21 ENCOUNTER — FORM ENCOUNTER (OUTPATIENT)
Age: 57
End: 2022-02-21

## 2022-02-21 RX ORDER — ASCORBIC ACID 60 MG
500 TABLET,CHEWABLE ORAL
Refills: 0 | Status: DISCONTINUED | OUTPATIENT
Start: 2022-02-22 | End: 2022-02-24

## 2022-02-21 RX ORDER — HYDROMORPHONE HYDROCHLORIDE 2 MG/ML
0.5 INJECTION INTRAMUSCULAR; INTRAVENOUS; SUBCUTANEOUS EVERY 4 HOURS
Refills: 0 | Status: DISCONTINUED | OUTPATIENT
Start: 2022-02-22 | End: 2022-02-24

## 2022-02-21 RX ORDER — SODIUM CHLORIDE 9 MG/ML
1000 INJECTION, SOLUTION INTRAVENOUS
Refills: 0 | Status: DISCONTINUED | OUTPATIENT
Start: 2022-02-22 | End: 2022-02-24

## 2022-02-21 RX ORDER — OXYCODONE HYDROCHLORIDE 5 MG/1
5 TABLET ORAL EVERY 4 HOURS
Refills: 0 | Status: DISCONTINUED | OUTPATIENT
Start: 2022-02-22 | End: 2022-02-24

## 2022-02-21 RX ORDER — DEXAMETHASONE 0.5 MG/5ML
8 ELIXIR ORAL ONCE
Refills: 0 | Status: COMPLETED | OUTPATIENT
Start: 2022-02-23 | End: 2022-02-23

## 2022-02-21 RX ORDER — FOLIC ACID 0.8 MG
1 TABLET ORAL DAILY
Refills: 0 | Status: DISCONTINUED | OUTPATIENT
Start: 2022-02-22 | End: 2022-02-24

## 2022-02-21 RX ORDER — CELECOXIB 200 MG/1
200 CAPSULE ORAL EVERY 12 HOURS
Refills: 0 | Status: DISCONTINUED | OUTPATIENT
Start: 2022-02-23 | End: 2022-02-24

## 2022-02-21 RX ORDER — ACETAMINOPHEN 500 MG
975 TABLET ORAL EVERY 8 HOURS
Refills: 0 | Status: DISCONTINUED | OUTPATIENT
Start: 2022-02-22 | End: 2022-02-24

## 2022-02-21 RX ORDER — PROCHLORPERAZINE MALEATE 5 MG
10 TABLET ORAL EVERY 8 HOURS
Refills: 0 | Status: DISCONTINUED | OUTPATIENT
Start: 2022-02-22 | End: 2022-02-24

## 2022-02-21 RX ORDER — PANTOPRAZOLE SODIUM 20 MG/1
40 TABLET, DELAYED RELEASE ORAL
Refills: 0 | Status: DISCONTINUED | OUTPATIENT
Start: 2022-02-22 | End: 2022-02-24

## 2022-02-21 RX ORDER — OXYCODONE HYDROCHLORIDE 5 MG/1
10 TABLET ORAL EVERY 4 HOURS
Refills: 0 | Status: DISCONTINUED | OUTPATIENT
Start: 2022-02-22 | End: 2022-02-24

## 2022-02-21 RX ORDER — VANCOMYCIN HCL 1 G
1000 VIAL (EA) INTRAVENOUS ONCE
Refills: 0 | Status: COMPLETED | OUTPATIENT
Start: 2022-02-22 | End: 2022-02-22

## 2022-02-21 RX ORDER — ONDANSETRON 8 MG/1
8 TABLET, FILM COATED ORAL EVERY 8 HOURS
Refills: 0 | Status: DISCONTINUED | OUTPATIENT
Start: 2022-02-22 | End: 2022-02-24

## 2022-02-21 RX ORDER — SENNA PLUS 8.6 MG/1
2 TABLET ORAL AT BEDTIME
Refills: 0 | Status: DISCONTINUED | OUTPATIENT
Start: 2022-02-22 | End: 2022-02-24

## 2022-02-21 RX ORDER — POLYETHYLENE GLYCOL 3350 17 G/17G
17 POWDER, FOR SOLUTION ORAL AT BEDTIME
Refills: 0 | Status: DISCONTINUED | OUTPATIENT
Start: 2022-02-22 | End: 2022-02-24

## 2022-02-21 RX ORDER — FERROUS SULFATE 325(65) MG
325 TABLET ORAL DAILY
Refills: 0 | Status: DISCONTINUED | OUTPATIENT
Start: 2022-02-22 | End: 2022-02-24

## 2022-02-22 ENCOUNTER — INPATIENT (INPATIENT)
Facility: HOSPITAL | Age: 57
LOS: 1 days | Discharge: HOME | DRG: 470 | End: 2022-02-24
Attending: ORTHOPAEDIC SURGERY | Admitting: ORTHOPAEDIC SURGERY
Payer: COMMERCIAL

## 2022-02-22 ENCOUNTER — RESULT REVIEW (OUTPATIENT)
Age: 57
End: 2022-02-22

## 2022-02-22 ENCOUNTER — TRANSCRIPTION ENCOUNTER (OUTPATIENT)
Age: 57
End: 2022-02-22

## 2022-02-22 ENCOUNTER — APPOINTMENT (OUTPATIENT)
Dept: ORTHOPEDIC SURGERY | Facility: HOSPITAL | Age: 57
End: 2022-02-22
Payer: COMMERCIAL

## 2022-02-22 VITALS
RESPIRATION RATE: 16 BRPM | TEMPERATURE: 97 F | HEART RATE: 71 BPM | DIASTOLIC BLOOD PRESSURE: 63 MMHG | OXYGEN SATURATION: 97 % | HEIGHT: 60 IN | WEIGHT: 145.06 LBS | SYSTOLIC BLOOD PRESSURE: 110 MMHG

## 2022-02-22 DIAGNOSIS — Z98.890 OTHER SPECIFIED POSTPROCEDURAL STATES: Chronic | ICD-10-CM

## 2022-02-22 DIAGNOSIS — Z96.641 PRESENCE OF RIGHT ARTIFICIAL HIP JOINT: Chronic | ICD-10-CM

## 2022-02-22 DIAGNOSIS — M87.00 IDIOPATHIC ASEPTIC NECROSIS OF UNSPECIFIED BONE: ICD-10-CM

## 2022-02-22 LAB
ANION GAP SERPL CALC-SCNC: 6 MMOL/L — SIGNIFICANT CHANGE UP (ref 5–17)
BUN SERPL-MCNC: 14 MG/DL — SIGNIFICANT CHANGE UP (ref 7–23)
CALCIUM SERPL-MCNC: 8 MG/DL — LOW (ref 8.5–10.1)
CHLORIDE SERPL-SCNC: 112 MMOL/L — HIGH (ref 96–108)
CO2 SERPL-SCNC: 19 MMOL/L — LOW (ref 22–31)
CREAT SERPL-MCNC: 0.6 MG/DL — SIGNIFICANT CHANGE UP (ref 0.5–1.3)
GLUCOSE SERPL-MCNC: 107 MG/DL — HIGH (ref 70–99)
POTASSIUM SERPL-MCNC: 3.7 MMOL/L — SIGNIFICANT CHANGE UP (ref 3.5–5.3)
POTASSIUM SERPL-SCNC: 3.7 MMOL/L — SIGNIFICANT CHANGE UP (ref 3.5–5.3)
SODIUM SERPL-SCNC: 137 MMOL/L — SIGNIFICANT CHANGE UP (ref 135–145)

## 2022-02-22 PROCEDURE — 99221 1ST HOSP IP/OBS SF/LOW 40: CPT

## 2022-02-22 PROCEDURE — C1776: CPT

## 2022-02-22 PROCEDURE — C1713: CPT

## 2022-02-22 PROCEDURE — 97530 THERAPEUTIC ACTIVITIES: CPT | Mod: GP

## 2022-02-22 PROCEDURE — 88305 TISSUE EXAM BY PATHOLOGIST: CPT | Mod: 26

## 2022-02-22 PROCEDURE — 82962 GLUCOSE BLOOD TEST: CPT

## 2022-02-22 PROCEDURE — 36415 COLL VENOUS BLD VENIPUNCTURE: CPT

## 2022-02-22 PROCEDURE — 73501 X-RAY EXAM HIP UNI 1 VIEW: CPT | Mod: 26,LT

## 2022-02-22 PROCEDURE — 85027 COMPLETE CBC AUTOMATED: CPT

## 2022-02-22 PROCEDURE — 88305 TISSUE EXAM BY PATHOLOGIST: CPT

## 2022-02-22 PROCEDURE — 27130 TOTAL HIP ARTHROPLASTY: CPT | Mod: AS,LT

## 2022-02-22 PROCEDURE — 97116 GAIT TRAINING THERAPY: CPT | Mod: GP

## 2022-02-22 PROCEDURE — 73501 X-RAY EXAM HIP UNI 1 VIEW: CPT | Mod: LT

## 2022-02-22 PROCEDURE — 80048 BASIC METABOLIC PNL TOTAL CA: CPT

## 2022-02-22 PROCEDURE — 97163 PT EVAL HIGH COMPLEX 45 MIN: CPT | Mod: GP

## 2022-02-22 PROCEDURE — 27130 TOTAL HIP ARTHROPLASTY: CPT | Mod: LT

## 2022-02-22 RX ORDER — PANTOPRAZOLE SODIUM 20 MG/1
1 TABLET, DELAYED RELEASE ORAL
Qty: 30 | Refills: 0
Start: 2022-02-22 | End: 2022-03-23

## 2022-02-22 RX ORDER — ASPIRIN/CALCIUM CARB/MAGNESIUM 324 MG
325 TABLET ORAL
Refills: 0 | Status: DISCONTINUED | OUTPATIENT
Start: 2022-02-22 | End: 2022-02-22

## 2022-02-22 RX ORDER — ESCITALOPRAM OXALATE 10 MG/1
0.5 TABLET, FILM COATED ORAL
Qty: 0 | Refills: 0 | DISCHARGE

## 2022-02-22 RX ORDER — ACETAMINOPHEN 500 MG
975 TABLET ORAL ONCE
Refills: 0 | Status: COMPLETED | OUTPATIENT
Start: 2022-02-22 | End: 2022-02-22

## 2022-02-22 RX ORDER — ESCITALOPRAM OXALATE 10 MG/1
20 TABLET, FILM COATED ORAL AT BEDTIME
Refills: 0 | Status: DISCONTINUED | OUTPATIENT
Start: 2022-02-22 | End: 2022-02-24

## 2022-02-22 RX ORDER — FENTANYL CITRATE 50 UG/ML
50 INJECTION INTRAVENOUS
Refills: 0 | Status: DISCONTINUED | OUTPATIENT
Start: 2022-02-22 | End: 2022-02-22

## 2022-02-22 RX ORDER — VANCOMYCIN HCL 1 G
1000 VIAL (EA) INTRAVENOUS ONCE
Refills: 0 | Status: COMPLETED | OUTPATIENT
Start: 2022-02-22 | End: 2022-02-22

## 2022-02-22 RX ORDER — OXYCODONE HYDROCHLORIDE 5 MG/1
5 TABLET ORAL ONCE
Refills: 0 | Status: DISCONTINUED | OUTPATIENT
Start: 2022-02-22 | End: 2022-02-22

## 2022-02-22 RX ORDER — ONDANSETRON 8 MG/1
4 TABLET, FILM COATED ORAL ONCE
Refills: 0 | Status: DISCONTINUED | OUTPATIENT
Start: 2022-02-22 | End: 2022-02-22

## 2022-02-22 RX ORDER — ESTRADIOL AND NORETHINDRONE ACETATE .5; .1 MG/1; MG/1
1 TABLET, FILM COATED ORAL
Qty: 0 | Refills: 0 | DISCHARGE

## 2022-02-22 RX ORDER — ASCORBIC ACID 60 MG
1 TABLET,CHEWABLE ORAL
Qty: 0 | Refills: 0 | DISCHARGE

## 2022-02-22 RX ORDER — ACETAMINOPHEN 500 MG
2 TABLET ORAL
Qty: 84 | Refills: 0
Start: 2022-02-22 | End: 2022-03-07

## 2022-02-22 RX ORDER — SODIUM CHLORIDE 9 MG/ML
1000 INJECTION, SOLUTION INTRAVENOUS
Refills: 0 | Status: DISCONTINUED | OUTPATIENT
Start: 2022-02-22 | End: 2022-02-22

## 2022-02-22 RX ORDER — FAMOTIDINE 10 MG/ML
20 INJECTION INTRAVENOUS ONCE
Refills: 0 | Status: COMPLETED | OUTPATIENT
Start: 2022-02-22 | End: 2022-02-22

## 2022-02-22 RX ORDER — PANTOPRAZOLE SODIUM 20 MG/1
40 TABLET, DELAYED RELEASE ORAL ONCE
Refills: 0 | Status: COMPLETED | OUTPATIENT
Start: 2022-02-22 | End: 2022-02-22

## 2022-02-22 RX ORDER — ASPIRIN/CALCIUM CARB/MAGNESIUM 324 MG
1 TABLET ORAL
Qty: 60 | Refills: 0
Start: 2022-02-22 | End: 2022-03-23

## 2022-02-22 RX ORDER — POLYETHYLENE GLYCOL 3350 17 G/17G
17 POWDER, FOR SOLUTION ORAL
Qty: 1 | Refills: 0
Start: 2022-02-22 | End: 2022-03-07

## 2022-02-22 RX ORDER — OXYCODONE HYDROCHLORIDE 5 MG/1
1 TABLET ORAL
Qty: 30 | Refills: 0
Start: 2022-02-22 | End: 2022-02-26

## 2022-02-22 RX ORDER — BENZOYL PEROXIDE MICRONIZED 5.8 %
1 TOWELETTE (EA) TOPICAL
Qty: 0 | Refills: 0 | DISCHARGE

## 2022-02-22 RX ORDER — ASPIRIN/CALCIUM CARB/MAGNESIUM 324 MG
325 TABLET ORAL
Refills: 0 | Status: DISCONTINUED | OUTPATIENT
Start: 2022-02-22 | End: 2022-02-24

## 2022-02-22 RX ORDER — SENNA PLUS 8.6 MG/1
2 TABLET ORAL
Qty: 28 | Refills: 0
Start: 2022-02-22 | End: 2022-03-07

## 2022-02-22 RX ADMIN — OXYCODONE HYDROCHLORIDE 10 MILLIGRAM(S): 5 TABLET ORAL at 17:31

## 2022-02-22 RX ADMIN — Medication 975 MILLIGRAM(S): at 21:14

## 2022-02-22 RX ADMIN — OXYCODONE HYDROCHLORIDE 10 MILLIGRAM(S): 5 TABLET ORAL at 21:14

## 2022-02-22 RX ADMIN — Medication 250 MILLIGRAM(S): at 18:47

## 2022-02-22 RX ADMIN — Medication 250 MILLIGRAM(S): at 07:01

## 2022-02-22 RX ADMIN — SODIUM CHLORIDE 3 MILLILITER(S): 9 INJECTION INTRAMUSCULAR; INTRAVENOUS; SUBCUTANEOUS at 23:18

## 2022-02-22 RX ADMIN — SODIUM CHLORIDE 100 MILLILITER(S): 9 INJECTION, SOLUTION INTRAVENOUS at 09:24

## 2022-02-22 RX ADMIN — HYDROMORPHONE HYDROCHLORIDE 0.5 MILLIGRAM(S): 2 INJECTION INTRAMUSCULAR; INTRAVENOUS; SUBCUTANEOUS at 14:06

## 2022-02-22 RX ADMIN — Medication 325 MILLIGRAM(S): at 21:13

## 2022-02-22 RX ADMIN — Medication 975 MILLIGRAM(S): at 21:16

## 2022-02-22 RX ADMIN — ESCITALOPRAM OXALATE 20 MILLIGRAM(S): 10 TABLET, FILM COATED ORAL at 21:14

## 2022-02-22 RX ADMIN — FAMOTIDINE 20 MILLIGRAM(S): 10 INJECTION INTRAVENOUS at 07:01

## 2022-02-22 RX ADMIN — OXYCODONE HYDROCHLORIDE 10 MILLIGRAM(S): 5 TABLET ORAL at 11:58

## 2022-02-22 RX ADMIN — PANTOPRAZOLE SODIUM 40 MILLIGRAM(S): 20 TABLET, DELAYED RELEASE ORAL at 07:03

## 2022-02-22 RX ADMIN — OXYCODONE HYDROCHLORIDE 10 MILLIGRAM(S): 5 TABLET ORAL at 17:01

## 2022-02-22 RX ADMIN — HYDROMORPHONE HYDROCHLORIDE 0.5 MILLIGRAM(S): 2 INJECTION INTRAMUSCULAR; INTRAVENOUS; SUBCUTANEOUS at 14:36

## 2022-02-22 RX ADMIN — OXYCODONE HYDROCHLORIDE 10 MILLIGRAM(S): 5 TABLET ORAL at 21:55

## 2022-02-22 RX ADMIN — HYDROMORPHONE HYDROCHLORIDE 0.5 MILLIGRAM(S): 2 INJECTION INTRAMUSCULAR; INTRAVENOUS; SUBCUTANEOUS at 18:59

## 2022-02-22 RX ADMIN — HYDROMORPHONE HYDROCHLORIDE 0.5 MILLIGRAM(S): 2 INJECTION INTRAMUSCULAR; INTRAVENOUS; SUBCUTANEOUS at 19:29

## 2022-02-22 RX ADMIN — SODIUM CHLORIDE 3 MILLILITER(S): 9 INJECTION INTRAMUSCULAR; INTRAVENOUS; SUBCUTANEOUS at 14:19

## 2022-02-22 RX ADMIN — OXYCODONE HYDROCHLORIDE 10 MILLIGRAM(S): 5 TABLET ORAL at 12:28

## 2022-02-22 NOTE — DISCHARGE NOTE NURSING/CASE MANAGEMENT/SOCIAL WORK - PATIENT PORTAL LINK FT
You can access the FollowMyHealth Patient Portal offered by [facility] by registering at the following website: [email or web address]. By joining [email or web address] FollowMyHealth portal, you will also be able to view your health information using other applications (apps) compatible with our system.

## 2022-02-22 NOTE — DISCHARGE NOTE PROVIDER - NSDCFUADDINST_GEN_ALL_CORE_FT
Discharge Instructions for Left Total Hip Arthroplasty:    1. ACTIVITY: WBAT. Rolling walker. Posterior Hip Dislocation Precautions. Abduction Pillow while in bed for 6 weeks. Daily PT.  2. CALL FOR: fever over 101, wound redness, drainage or open area, calf pain/calf swelling.  3. BANDAGE: Change dressing to a new Mepilex Ag bandage POD7 (3/1/22). May change sooner if dressing saturated or falling off. DO NOT REMOVE BANDAGE TO CHECK WOUND ON INTAKE.  4. STAPLES: RN Remove Staples POD14 (3/8/22).  5. SHOWER: Okay to shower. Do not soak, submerge or let shower stream beat on dressing/wound.  6. DVT PE Prophylaxis: Managed by Anticoag Team.  See Med Rec.  7.  GI: Continue Protonix daily while on Anticoagulant. eRx has been sent to your pharmacy.  8. LABS: INR only if on Coumadin.  9.  FOLLOW UP: Dr. Rowland in 21 days (1 week after staples removed). Call to schedule.    10. MEDICATION: eRX sent to your pharmacy for  if you go home.  11.**Call office if medications not covered under your insurance, especially BLOOD CLOT PREVENTION/anticoagulant medication.

## 2022-02-22 NOTE — DISCHARGE NOTE PROVIDER - HOSPITAL COURSE
H&P:  Pt is a 56y Female    PAST MEDICAL & SURGICAL HISTORY:  Anxiety    History of depression  Off Wellbutrin    Osteoarthritis    Avascular necrosis    History of colon polyps    Breast cyst  followed by gyn.    History of total hip replacement, right  11/ 2016    History of colonoscopy with polypectomy  last done Summer 2021    History of bunionectomy  right x 2, left x 1. In 2020    History of ovarian cystectomy  1998    H/O inguinal hernia repair  bilateral 1969         Now s/p Left Total Hip Arthroplasty. Pt is afebrile with stable vital signs. Pain is controlled. Alert and Oriented. Exam reveals intact EHL FHL TA GS, +DP. Dressing is clean and dry.    Hospital Course:  Patient presented to [facility] medically cleared for elective Left Total Hip Replacement Surgery, having failed outpatient conservative management. Prophylactic antibiotics were started before the procedure and continued for 24 hours. They were admitted after surgery to the orthopedic floor. There were no complications during the hospital stay. All home medications were continued.     **Pt received **U PRBC post op for Acute Blood Loss Anemia.    Routine consults were obtained from the Anticoagulation Team for DVT/PE prophylaxis, from Physical Therapy for twice daily PT, and from the Hospitalist for Medical Co-management. Patient was placed on anticoagulation. Pertinent home medications were continued. Daily labs were followed.      POD 0 pt was stable overnight. No major events post-op. Pt received PT twice daily. The plan is for DC to home with home PT** or to Rehab for ongoing PT**. The orthopedic Attending is aware and agrees.    ******INCOMPLETE NOTE IN PREPARATION FOR DISPO PLANNING*******  ******INCOMPLETE NOTE IN PREPARATION FOR DISPO PLANNING*******  ******INCOMPLETE NOTE IN PREPARATION FOR DISPO PLANNING******* H&P:  Pt is a 56y Female    PAST MEDICAL & SURGICAL HISTORY:  Anxiety    History of depression  Off Wellbutrin    Osteoarthritis    Avascular necrosis    History of colon polyps    Breast cyst  followed by gyn.    History of total hip replacement, right  11/ 2016    History of colonoscopy with polypectomy  last done Summer 2021    History of bunionectomy  right x 2, left x 1. In 2020    History of ovarian cystectomy  1998    H/O inguinal hernia repair  bilateral 1969         Now s/p Left Total Hip Arthroplasty. Pt is afebrile with stable vital signs. Pain is controlled. Alert and Oriented. Exam reveals intact EHL FHL TA GS, +DP. Dressing is clean and dry.    Hospital Course:  Patient presented to [facility] medically cleared for elective Left Total Hip Replacement Surgery, having failed outpatient conservative management. Prophylactic antibiotics were started before the procedure and continued for 24 hours. They were admitted after surgery to the orthopedic floor. There were no complications during the hospital stay. All home medications were continued.       Routine consults were obtained from the Anticoagulation Team for DVT/PE prophylaxis, from Physical Therapy for twice daily PT, and from the Hospitalist for Medical Co-management. Patient was placed on anticoagulation. Pertinent home medications were continued. Daily labs were followed.      POD 0 pt was stable overnight. No major events post-op. Pt received PT twice daily. The plan is for DC to home with home PT. The orthopedic Attending is aware and agrees.

## 2022-02-22 NOTE — DISCHARGE NOTE PROVIDER - CARE PROVIDER_API CALL
Jay Rowland)  Orthopaedic Surgery  [address] [place] [address]  Phone: [phone]  Fax: [phone]  Follow Up Time:

## 2022-02-22 NOTE — DISCHARGE NOTE NURSING/CASE MANAGEMENT/SOCIAL WORK - NSDCPEFALRISK_GEN_ALL_CORE
For information on Fall & Injury Prevention, visit: [email or web address] OR  [email or web address] OR  https://www.cdc.gov/steadi/patient.html

## 2022-02-22 NOTE — CONSULT NOTE ADULT - ASSESSMENT
This is a 56 year old female with history of Avascular necrosis to her left hip.  Now s/p left total hip replacement on 2-22-22.   Pt has a high thrombosis risk and requires anticoagulation prophylaxis.  Discussed with pt the use of aspirin 325mg enteric coated as her anticoagulant and she is in agreement    Plan::  :Aspirin 325mg enteric coated one tab twice a day for four weeks post procedure last dose on 3-  :daily cbc/bmp  :le Venodynes  :increase mobility as tolerated    :thanks for consult will f/u tomorrow to reevaluate pt's anticoagulation

## 2022-02-22 NOTE — PHYSICAL THERAPY INITIAL EVALUATION ADULT - ACTIVE RANGE OF MOTION EXAMINATION, REHAB EVAL
Except L Hip and Knee Flex both limited due to pain and weakness/bilateral upper extremity Active ROM was WFL (within functional limits)/bilateral  lower extremity Active ROM was WFL (within functional limits)

## 2022-02-22 NOTE — PHYSICAL THERAPY INITIAL EVALUATION ADULT - LIGHT TOUCH SENSATION, LLE, REHAB EVAL
due to anesthesia nerve block but able to demonstrate full AROM Ankle DF and Full Quad Strength/mild impairment

## 2022-02-22 NOTE — PATIENT PROFILE ADULT - FALL HARM RISK - UNIVERSAL INTERVENTIONS
Bed in lowest position, wheels locked, appropriate side rails in place/Call bell, personal items and telephone in reach/Instruct patient to call for assistance before getting out of bed or chair/Non-slip footwear when patient is out of bed/[place] to call system/Physically safe environment - no spills, clutter or unnecessary equipment/Purposeful Proactive Rounding/Room/bathroom lighting operational, light cord in reach

## 2022-02-22 NOTE — PHYSICAL THERAPY INITIAL EVALUATION ADULT - MODALITIES TREATMENT COMMENTS
Pt left as rec'd supine in bed in NAD with IV, Hip ABD Pillow, BLE SCD's all intact. CBIR. Pt instructed to not get up alone. L Hip Ice Pack Intact. Bed alarm activated. RN Ryan aware

## 2022-02-22 NOTE — PHYSICAL THERAPY INITIAL EVALUATION ADULT - IMPAIRMENTS CONTRIBUTING TO GAIT DEVIATIONS, PT EVAL
5/10 L Hip Pain during ambulation/impaired balance/pain/decreased ROM/decreased sensation/decreased strength

## 2022-02-22 NOTE — CONSULT NOTE ADULT - SUBJECTIVE AND OBJECTIVE BOX
c/c: left hip pain    HPI: 56F, pmh of anxiety, OA, AVN left hip who is admitted for elective left hip replacement after failing outpt conservative measures. Hospitalist service consulted for medical comanagement.   pt seen and examined on 2N. c/o pain left hip. Had gotten 10mg oxycodone without relief. No sob/chest pain. Was able to ambulate to door with physical therapy. hasn't voided yet.     ROS: all 10 systems reviewed and is as above otherwise negative.     PMH: as above  PSH: bilateral inguinal hernia repair, bunionectomy, ovarian cystectomy, right hip replacement  F/H: mother-colon ca, OA, DM        Father-CAD, CVA, Parkinsons.  Social: no tobacco, occasional etoh  Allergies: cipro, pcn  Home meds: see med rec    Vital Signs Last 24 Hrs  T(C): 37.2 (02-22-22 @ 11:45), Max: 37.2 (02-22-22 @ 11:45)  T(F): 98.9 (02-22-22 @ 11:45), Max: 98.9 (02-22-22 @ 11:45)  HR: 100 (02-22-22 @ 11:45) (66 - 100)  BP: 137/89 (02-22-22 @ 11:45) (94/60 - 137/89)  RR: 18 (02-22-22 @ 11:45) (14 - 22)  SpO2: 99% (02-22-22 @ 11:45) (97% - 100%)    PHYSICAL EXAM:    GENERAL: Comfortable, no acute distress   HEAD:  Normocephalic, atraumatic  EYES: EOMI, PERRLA  HEENT: Moist mucous membranes  NECK: Supple, No JVD  NERVOUS SYSTEM:  Alert & Oriented X3, non focal.   CHEST/LUNG: Clear to auscultation bilaterally  HEART: Regular rate and rhythm  ABDOMEN: Soft, Nontender, Nondistended, Bowel sounds present  GENITOURINARY: Voiding, no palpable bladder  EXTREMITIES:   No clubbing, cyanosis, or edema  MUSCULOSKELETAL-left hip dressing c/d/i  SKIN-no rash        LABS:                        11.9   6.18  )-----------( 229      ( 22 Feb 2022 10:00 )             36.9     02-22    137  |  112<H>  |  14  ----------------------------<  107<H>  3.7   |  19<L>  |  0.60    Ca    8.0<L>      22 Feb 2022 09:14        MEDICATIONS  (STANDING):  acetaminophen     Tablet .. 975 milliGRAM(s) Oral every 8 hours  ascorbic acid 500 milliGRAM(s) Oral two times a day  aspirin enteric coated 325 milliGRAM(s) Oral two times a day  escitalopram 20 milliGRAM(s) Oral at bedtime  ferrous    sulfate 325 milliGRAM(s) Oral daily  folic acid 1 milliGRAM(s) Oral daily  lactated ringers. 1000 milliLiter(s) (75 mL/Hr) IV Continuous <Continuous>  multivitamin 1 Tablet(s) Oral daily  pantoprazole    Tablet 40 milliGRAM(s) Oral before breakfast  polyethylene glycol 3350 17 Gram(s) Oral at bedtime  senna 2 Tablet(s) Oral at bedtime  sodium chloride 0.9% lock flush 3 milliLiter(s) IV Push every 8 hours  vancomycin  IVPB 1000 milliGRAM(s) IV Intermittent once    MEDICATIONS  (PRN):  HYDROmorphone  Injectable 0.5 milliGRAM(s) SubCutaneous every 4 hours PRN Severe Pain (7 - 10)  ondansetron Injectable 8 milliGRAM(s) IV Push every 8 hours PRN Nausea and/or Vomiting  oxyCODONE    IR 5 milliGRAM(s) Oral every 4 hours PRN Mild Pain (1 - 3)  oxyCODONE    IR 10 milliGRAM(s) Oral every 4 hours PRN Moderate Pain (4 - 6)  prochlorperazine   Injectable 10 milliGRAM(s) IV Push every 8 hours PRN Nausea and/or Vomiting      ASSESSMENT AND PLAN:  56f, PMH as above a/w:    1. AVN Left hip s/p left hip replacement:  -POD#0  -pain control with prn dilaudid/oxycodone/tylenol  -physical therapy   -incentive spirometry  -bowel regimen.     2. Anxiety  -continue lexapro    3. DVT px:  -per a/c services    thank you, will follow    
  HPI: This is a 56 years old female with avascular necrosis. Report soft tissue injury to left hip. Injured herself on exercise machine. Soft tissue healed peg pain persist. Saw Dr. Rowland. X-rays of left hip done indicating "joint issue" and decrease cartilage She did physical therapy. Pain persist in different areas of the cyst. She went back to Dr. Rowland. Additional x-rays indicate more cartilage decrease and the development of a cyst in the joint.  Pt admitted to [facility] for planned left total hip replacement.  Right hip replaced in the past.         Patient is a 56y old  Female who presents with a chief complaint of Left Total Hip Arthroplasty (2022 08:04) s/p left THR  on 22.       Consulted by Dr. Jay Rowland   for VTE prophylaxis, risk stratification, and anticoagulation management.    PAST MEDICAL & SURGICAL HISTORY:  Anxiety    History of depression  Off Wellbutrin    Osteoarthritis    Avascular necrosis    History of colon polyps    Breast cyst  followed by gyn.    History of total hip replacement, right  2016    History of colonoscopy with polypectomy  last done Summer 2021    History of bunionectomy  right x 2, left x 1. In     History of ovarian cystectomy      H/O inguinal hernia repair  bilateral 1969        FAMILY HISTORY:  Family hx of colon cancer (Mother)    FH: type 2 diabetes (Mother)    Family history of osteoarthritis (Mother)    Family history of stroke (Father)    Family history of parkinsonism (Father)    FH: CAD (coronary artery disease) (Father)    History of hypertension in sibling (Sibling)        Interval Note:  2022: Pt seen at bedside on 2north.  Discussed with her the need for anticoagulation.  discussed the use of enteric coated aspirin 325mg tab one twice a day for 4 weeks. Pt has no concerns and states she understands the need.            CAPRINI SCORE  AGE RELATED RISK FACTORS                                                       MOBILITY RELATED FACTORS  [x ] Age 41-60 years                                            (1 Point)                  [ ] Bed rest                                                        (1 Point)  [ ] Age: 61-74 years                                           (2 Points)                [ ] Plaster cast                                                   (2 Points)  [ ] Age= 75 years                                              (3 Points)                 [ ] Bed bound for more than 72 hours                   (2 Points)    DISEASE RELATED RISK FACTORS                                               GENDER SPECIFIC FACTORS  [ ] Edema in the lower extremities                       (1 Point)           [ ] Pregnancy                                                            (1 Point)  [ ] Varicose veins                                               (1 Point)                  [ ] Post-partum < 6 weeks                                      (1 Point)             [x ] BMI > 25 Kg/m2                                            (1 Point)                  [ ] Hormonal therapy or oral contraception       (1 Point)                 [ ] Sepsis (in the previous month)                        (1 Point)             [ ] History of pregnancy complications                (1Point)  [ ] Pneumonia or serious lung disease                                             [ ] Unexplained or recurrent  (=/>3), premature                                 (In the previous month)                               (1 Point)                birth with toxemia or growth-restricted infant (1 Point)  [ ] Abnormal pulmonary function test            (1 Point)                                   SURGERY RELATED RISK FACTORS  [ ] Acute myocardial infarction                       (1 Point)                  [ ]  Section                                         (1 Point)  [ ] Congestive heart failure (in the previous month) (1 Point)   [ ] Minor surgery   lasting <45 minutes       (1 Point)   [ ] Inflammatory bowel disease                             (1 Point)          [ ] Arthroscopic surgery                                  (2 Points)  [ ] Central venous access                                    (2 Points)            [ ] General surgery lasting >45 minutes      (2 Points)       [ ] Stroke (in the previous month)                  (5 Points)            [x ] Elective major lower extremity arthroplasty (5 Points)                                   [  ] Malignancy (present or past include skin melanoma                                          but exclude  basal skin cell)    (2 points)                                      TRAUMA RELATED RISK FACTORS                HEMATOLOGY RELATED FACTORS                                  [ ] Fracture of the hip, pelvis, or leg                       (5 Points)  [ ] Prior episodes of VTE                                     (3 Points)          [ ] Acute spinal cord injury (in the previous month)  (5 Points)  [ ] Positive family history for VTE                         (3 Points)       [ ] Paralysis (less than 1 month)                          (5 Points)  [ ] Prothrombin 93880 A                                      (3 Points)         [ ] Multiple Trauma (within 1month)                 (5Points)                                                                                                                                                                [ ] Factor V Leiden                                          (3 Points)                                OTHER RISK FACTORS                          [ ] Lupus anticoagulants                                     (3 Points)                       [ ] BMI > 40                          (1 Point)                                                         [ ] Anticardiolipin antibodies                                (3 Points)                   [ ] Smoking                              (1Point)                                                [ ] High homocysteine in the blood                      (3 Points)                [  ] Diabetes requiring insulin (1point)                         [ ] Other congenital or acquired thrombophilia       (3 Points)          [  ] Chemotherapy                   (1 Point)  [ ] Heparin induced thrombocytopenia                  (3 Points)             [  ] Blood Transfusion                (1 point)                                                                                                             Total Score [7]                                                                                                                                                                                                                                                                                                                                                                                                                                            IMPROVE Bleeding Risk Score 1.5    Falls Risk:   High (x  )  Mod (  )  Low (  )  crcl: 108.2        cr: .60         BMI: 28.3            EBL:  150 ml  Time procedure    : starts:  7:55            :ends: 8:40      Tourniquet time:        Denies any personal or familial history of clotting or bleeding disorders.    Allergies    ciprofloxacin (Hives)  penicillins (Hives)    Intolerances        REVIEW OF SYSTEMS    (  )Fever	     (  )Constipation	(  )SOB				(  )Headache	(  )Dysuria  (  )Chills	     (  )Melena	(  )Dyspnea present on exertion	                    (  )Dizziness                    (  )Polyuria  (  )Nausea	     (  )Hematochezia	(  )Cough			                    (  )Syncope   	(  )Hematuria  (  )Vomiting    (  )Chest Pain	(  )Wheezing			(  )Weakness  (  )Diarrhea     (  )Palpitations	(  )Anorexia			(x  )Myalgia  (x) hip pain    Pertinent positives in HPI and daily subjective.  All other ROS negative.      Vital Signs Last 24 Hrs  T(C): 37.2 (2022 11:45), Max: 37.2 (2022 11:45)  T(F): 98.9 (2022 11:45), Max: 98.9 (2022 11:45)  HR: 100 (2022 11:45) (66 - 100)  BP: 137/89 (2022 11:45) (94/60 - 137/89)  BP(mean): --  RR: 18 (2022 11:45) (14 - 22)  SpO2: 99% (2022 11:45) (97% - 100%)    PHYSICAL EXAM:    Constitutional: Appears Well    Neurological: A& O x 3    Skin: Warm    Respiratory and Thorax: normal effort; Breath sounds: normal; No rales/wheezing/rhonchi  	  Cardiovascular: S1, S2, regular, NMBR	    Gastrointestinal: BS + x 4Q, nontender	    Genitourinary:  Bladder nondistended, nontender    Musculoskeletal:   General Right:   no muscle/joint tenderness,   normal tone, no joint swelling,   ROM: full	    General Left:   no muscle/joint tenderness,   normal tone, no joint swelling,   ROM: limited    Hip:  Left: Dressing CDI;     Lower extrems:   Right: no calf tenderness              negative kailyn's sign               + pedal pulses    Left:   no calf tenderness              negative kailyn's sign               + pedal pulses                          11.9   6.18  )-----------( 229      ( 2022 10:00 )             36.9           137  |  112<H>  |  14  ----------------------------<  107<H>  3.7   |  19<L>  |  0.60    Ca    8.0<L>      2022 09:14        				    MEDICATIONS  (STANDING):  acetaminophen     Tablet .. 975 milliGRAM(s) Oral every 8 hours  ascorbic acid 500 milliGRAM(s) Oral two times a day  aspirin enteric coated 325 milliGRAM(s) Oral two times a day  escitalopram 20 milliGRAM(s) Oral at bedtime  ferrous    sulfate 325 milliGRAM(s) Oral daily  folic acid 1 milliGRAM(s) Oral daily  lactated ringers. 1000 milliLiter(s) IV Continuous <Continuous>  multivitamin 1 Tablet(s) Oral daily  pantoprazole    Tablet 40 milliGRAM(s) Oral before breakfast  polyethylene glycol 3350 17 Gram(s) Oral at bedtime  senna 2 Tablet(s) Oral at bedtime  sodium chloride 0.9% lock flush 3 milliLiter(s) IV Push every 8 hours  vancomycin  IVPB 1000 milliGRAM(s) IV Intermittent once          DVT Prophylaxis:  LMWH                   (  )  Heparin SQ           (  )  Coumadin             (  )  Xarelto                  (  )  Eliquis                   (  )  Venodynes           ( x )  Ambulation          (x  )  UFH                       (  )  Contraindicated  (  )  EC Aspirin             ( x )

## 2022-02-22 NOTE — PHYSICAL THERAPY INITIAL EVALUATION ADULT - GENERAL OBSERVATIONS, REHAB EVAL
Patient rec'd supine in bed in NAD with IV, Hip ABD Pillow and BLE SCD's all intact. Pt reported 5/10 L hip pain

## 2022-02-22 NOTE — DISCHARGE NOTE PROVIDER - NSDCCPCAREPLAN_GEN_ALL_CORE_FT
PRINCIPAL DISCHARGE DIAGNOSIS  Diagnosis: Avascular necrosis of hip, left  Assessment and Plan of Treatment:

## 2022-02-22 NOTE — PHYSICAL THERAPY INITIAL EVALUATION ADULT - PRECAUTIONS/LIMITATIONS, REHAB EVAL
on posterior hip precautions, falls risk reduction, therex review and the overall impact on the pt's ADLs and safety. + Pt demonstrated verbal recall of 1 / 3 posterior hip precautions/fall precautions/left hip precautions

## 2022-02-22 NOTE — DISCHARGE NOTE PROVIDER - NSDCMRMEDTOKEN_GEN_ALL_CORE_FT
Activella 1 mg-0.5 mg oral tablet: 1 tab(s) orally once a day  Calcium 600+D oral tablet: 2 tab(s) orally once a day  collagen: 2 tab(s) orally once a day  escitalopram 20 mg oral tablet: 0.5 tab(s) orally once a day (at bedtime)  Fish Oil 1000 mg oral capsule: 2 cap(s) orally once a day. Stopped prior to surgery  glucosamine hydrochloride 1500 mg oral tablet: 1 tab(s) orally once a day. stopped for surgery  MiraLax oral powder for reconstitution: 17 gram(s) orally once a day  as needed for constipation  Multiple Vitamins with Iron oral tablet: 1 tab(s) orally once a day. Stopped for surgery  naproxen sodium 220 mg oral capsule: 2 cap(s) orally 2 times a day. to stop 7 days priorto surgery  oxyCODONE 5 mg oral tablet: 1 tab(s) orally every 4 hours as needed for severe pain; MDD:6  pantoprazole 40 mg oral delayed release tablet: 1 tab(s) orally once a day   senna oral tablet: 2 tab(s) orally once a day (at bedtime)   Turmeric 500 mg oral capsule: 1 cap(s) orally once a day. stopped prior to surgery  Tylenol 500 mg oral tablet: 2 tab(s) orally every 6 hours, As Needed for pain   Tylenol Extra Strength 500 mg oral tablet: 2 tab(s) orally 3 times a day   Vitamin C 1000 mg oral tablet: 1 tab(s) orally once a day  Zinc 140 mg (as elemental zinc 50 mg) oral tablet: 1 tab(s) orally once a day   Activella 1 mg-0.5 mg oral tablet: 1 tab(s) orally once a day  Aspirin Enteric Coated 325 mg oral delayed release tablet: 1 tab(s) orally 2 times a day MDD:650mg take as directed by anticoagulation management last dose on 3-  Calcium 600+D oral tablet: 2 tab(s) orally once a day  escitalopram 20 mg oral tablet: 0.5 tab(s) orally once a day (at bedtime)  MiraLax oral powder for reconstitution: 17 gram(s) orally once a day  as needed for constipation  Multiple Vitamins with Iron oral tablet: 1 tab(s) orally once a day. Stopped for surgery  oxyCODONE 5 mg oral tablet: 1 tab(s) orally every 4 hours as needed for severe pain; MDD:6  pantoprazole 40 mg oral delayed release tablet: 1 tab(s) orally once a day   senna oral tablet: 2 tab(s) orally once a day (at bedtime)   Tylenol Extra Strength 500 mg oral tablet: 2 tab(s) orally 3 times a day   Vitamin C 1000 mg oral tablet: 1 tab(s) orally once a day

## 2022-02-23 LAB
ANION GAP SERPL CALC-SCNC: 6 MMOL/L — SIGNIFICANT CHANGE UP (ref 5–17)
BUN SERPL-MCNC: 11 MG/DL — SIGNIFICANT CHANGE UP (ref 7–23)
CALCIUM SERPL-MCNC: 8.5 MG/DL — SIGNIFICANT CHANGE UP (ref 8.5–10.1)
CHLORIDE SERPL-SCNC: 107 MMOL/L — SIGNIFICANT CHANGE UP (ref 96–108)
CO2 SERPL-SCNC: 26 MMOL/L — SIGNIFICANT CHANGE UP (ref 22–31)
CREAT SERPL-MCNC: 0.7 MG/DL — SIGNIFICANT CHANGE UP (ref 0.5–1.3)
GLUCOSE SERPL-MCNC: 145 MG/DL — HIGH (ref 70–99)
HCT VFR BLD CALC: 33.4 % — LOW (ref 34.5–45)
HGB BLD-MCNC: 10.7 G/DL — LOW (ref 11.5–15.5)
MCHC RBC-ENTMCNC: 31.7 PG — SIGNIFICANT CHANGE UP (ref 27–34)
MCHC RBC-ENTMCNC: 32 GM/DL — SIGNIFICANT CHANGE UP (ref 32–36)
MCV RBC AUTO: 98.8 FL — SIGNIFICANT CHANGE UP (ref 80–100)
PLATELET # BLD AUTO: 221 K/UL — SIGNIFICANT CHANGE UP (ref 150–400)
POTASSIUM SERPL-MCNC: 4.1 MMOL/L — SIGNIFICANT CHANGE UP (ref 3.5–5.3)
POTASSIUM SERPL-SCNC: 4.1 MMOL/L — SIGNIFICANT CHANGE UP (ref 3.5–5.3)
RBC # BLD: 3.38 M/UL — LOW (ref 3.8–5.2)
RBC # FLD: 13 % — SIGNIFICANT CHANGE UP (ref 10.3–14.5)
SODIUM SERPL-SCNC: 139 MMOL/L — SIGNIFICANT CHANGE UP (ref 135–145)
WBC # BLD: 5.31 K/UL — SIGNIFICANT CHANGE UP (ref 3.8–10.5)
WBC # FLD AUTO: 5.31 K/UL — SIGNIFICANT CHANGE UP (ref 3.8–10.5)

## 2022-02-23 PROCEDURE — 99232 SBSQ HOSP IP/OBS MODERATE 35: CPT

## 2022-02-23 PROCEDURE — 99231 SBSQ HOSP IP/OBS SF/LOW 25: CPT

## 2022-02-23 PROCEDURE — 99024 POSTOP FOLLOW-UP VISIT: CPT

## 2022-02-23 RX ORDER — ACETAMINOPHEN 500 MG
1000 TABLET ORAL ONCE
Refills: 0 | Status: COMPLETED | OUTPATIENT
Start: 2022-02-23 | End: 2022-02-23

## 2022-02-23 RX ORDER — KETOROLAC TROMETHAMINE 30 MG/ML
30 SYRINGE (ML) INJECTION ONCE
Refills: 0 | Status: DISCONTINUED | OUTPATIENT
Start: 2022-02-23 | End: 2022-02-23

## 2022-02-23 RX ADMIN — HYDROMORPHONE HYDROCHLORIDE 0.5 MILLIGRAM(S): 2 INJECTION INTRAMUSCULAR; INTRAVENOUS; SUBCUTANEOUS at 00:32

## 2022-02-23 RX ADMIN — CELECOXIB 200 MILLIGRAM(S): 200 CAPSULE ORAL at 23:01

## 2022-02-23 RX ADMIN — Medication 30 MILLIGRAM(S): at 14:54

## 2022-02-23 RX ADMIN — SENNA PLUS 2 TABLET(S): 8.6 TABLET ORAL at 23:01

## 2022-02-23 RX ADMIN — ESCITALOPRAM OXALATE 20 MILLIGRAM(S): 10 TABLET, FILM COATED ORAL at 23:08

## 2022-02-23 RX ADMIN — Medication 975 MILLIGRAM(S): at 14:54

## 2022-02-23 RX ADMIN — Medication 500 MILLIGRAM(S): at 09:06

## 2022-02-23 RX ADMIN — HYDROMORPHONE HYDROCHLORIDE 0.5 MILLIGRAM(S): 2 INJECTION INTRAMUSCULAR; INTRAVENOUS; SUBCUTANEOUS at 18:47

## 2022-02-23 RX ADMIN — Medication 325 MILLIGRAM(S): at 09:06

## 2022-02-23 RX ADMIN — OXYCODONE HYDROCHLORIDE 10 MILLIGRAM(S): 5 TABLET ORAL at 02:24

## 2022-02-23 RX ADMIN — Medication 500 MILLIGRAM(S): at 23:02

## 2022-02-23 RX ADMIN — HYDROMORPHONE HYDROCHLORIDE 0.5 MILLIGRAM(S): 2 INJECTION INTRAMUSCULAR; INTRAVENOUS; SUBCUTANEOUS at 12:02

## 2022-02-23 RX ADMIN — CELECOXIB 200 MILLIGRAM(S): 200 CAPSULE ORAL at 23:08

## 2022-02-23 RX ADMIN — SODIUM CHLORIDE 3 MILLILITER(S): 9 INJECTION INTRAMUSCULAR; INTRAVENOUS; SUBCUTANEOUS at 13:31

## 2022-02-23 RX ADMIN — OXYCODONE HYDROCHLORIDE 10 MILLIGRAM(S): 5 TABLET ORAL at 09:05

## 2022-02-23 RX ADMIN — OXYCODONE HYDROCHLORIDE 10 MILLIGRAM(S): 5 TABLET ORAL at 01:48

## 2022-02-23 RX ADMIN — Medication 975 MILLIGRAM(S): at 23:08

## 2022-02-23 RX ADMIN — Medication 101.6 MILLIGRAM(S): at 05:50

## 2022-02-23 RX ADMIN — Medication 1 MILLIGRAM(S): at 09:06

## 2022-02-23 RX ADMIN — Medication 1000 MILLIGRAM(S): at 06:35

## 2022-02-23 RX ADMIN — POLYETHYLENE GLYCOL 3350 17 GRAM(S): 17 POWDER, FOR SOLUTION ORAL at 23:02

## 2022-02-23 RX ADMIN — OXYCODONE HYDROCHLORIDE 10 MILLIGRAM(S): 5 TABLET ORAL at 10:05

## 2022-02-23 RX ADMIN — SODIUM CHLORIDE 3 MILLILITER(S): 9 INJECTION INTRAMUSCULAR; INTRAVENOUS; SUBCUTANEOUS at 23:08

## 2022-02-23 RX ADMIN — HYDROMORPHONE HYDROCHLORIDE 0.5 MILLIGRAM(S): 2 INJECTION INTRAMUSCULAR; INTRAVENOUS; SUBCUTANEOUS at 05:51

## 2022-02-23 RX ADMIN — Medication 1 TABLET(S): at 09:07

## 2022-02-23 RX ADMIN — Medication 325 MILLIGRAM(S): at 23:01

## 2022-02-23 RX ADMIN — HYDROMORPHONE HYDROCHLORIDE 0.5 MILLIGRAM(S): 2 INJECTION INTRAMUSCULAR; INTRAVENOUS; SUBCUTANEOUS at 00:12

## 2022-02-23 RX ADMIN — HYDROMORPHONE HYDROCHLORIDE 0.5 MILLIGRAM(S): 2 INJECTION INTRAMUSCULAR; INTRAVENOUS; SUBCUTANEOUS at 11:32

## 2022-02-23 RX ADMIN — SODIUM CHLORIDE 3 MILLILITER(S): 9 INJECTION INTRAMUSCULAR; INTRAVENOUS; SUBCUTANEOUS at 05:55

## 2022-02-23 RX ADMIN — HYDROMORPHONE HYDROCHLORIDE 0.5 MILLIGRAM(S): 2 INJECTION INTRAMUSCULAR; INTRAVENOUS; SUBCUTANEOUS at 23:38

## 2022-02-23 RX ADMIN — PANTOPRAZOLE SODIUM 40 MILLIGRAM(S): 20 TABLET, DELAYED RELEASE ORAL at 05:49

## 2022-02-23 RX ADMIN — CELECOXIB 200 MILLIGRAM(S): 200 CAPSULE ORAL at 09:06

## 2022-02-23 RX ADMIN — HYDROMORPHONE HYDROCHLORIDE 0.5 MILLIGRAM(S): 2 INJECTION INTRAMUSCULAR; INTRAVENOUS; SUBCUTANEOUS at 23:08

## 2022-02-23 RX ADMIN — ONDANSETRON 8 MILLIGRAM(S): 8 TABLET, FILM COATED ORAL at 06:05

## 2022-02-23 RX ADMIN — Medication 400 MILLIGRAM(S): at 06:05

## 2022-02-23 RX ADMIN — HYDROMORPHONE HYDROCHLORIDE 0.5 MILLIGRAM(S): 2 INJECTION INTRAMUSCULAR; INTRAVENOUS; SUBCUTANEOUS at 06:05

## 2022-02-23 NOTE — PROGRESS NOTE ADULT - ASSESSMENT
This is a 56 year old female with history of Avascular necrosis to her left hip.  Now s/p left total hip replacement on 2-22-22.   Pt has a high thrombosis risk and requires anticoagulation prophylaxis.  Discussed with pt the use of aspirin 325mg enteric coated as her anticoagulant and she is in agreement    Plan::  :Aspirin 325mg enteric coated one tab twice a day for four weeks post procedure last dose on 3-  :daily cbc/bmp  :le Venodynes  :increase mobility as tolerated  : thanks for consult will sign off please reconsult if needed.   Dispo: home

## 2022-02-24 VITALS
SYSTOLIC BLOOD PRESSURE: 109 MMHG | DIASTOLIC BLOOD PRESSURE: 63 MMHG | TEMPERATURE: 98 F | RESPIRATION RATE: 16 BRPM | HEART RATE: 64 BPM | OXYGEN SATURATION: 99 %

## 2022-02-24 LAB
ANION GAP SERPL CALC-SCNC: 7 MMOL/L — SIGNIFICANT CHANGE UP (ref 5–17)
BUN SERPL-MCNC: 16 MG/DL — SIGNIFICANT CHANGE UP (ref 7–23)
CALCIUM SERPL-MCNC: 8.4 MG/DL — LOW (ref 8.5–10.1)
CHLORIDE SERPL-SCNC: 109 MMOL/L — HIGH (ref 96–108)
CO2 SERPL-SCNC: 26 MMOL/L — SIGNIFICANT CHANGE UP (ref 22–31)
CREAT SERPL-MCNC: 0.62 MG/DL — SIGNIFICANT CHANGE UP (ref 0.5–1.3)
GLUCOSE SERPL-MCNC: 128 MG/DL — HIGH (ref 70–99)
HCT VFR BLD CALC: 28.4 % — LOW (ref 34.5–45)
HGB BLD-MCNC: 9.1 G/DL — LOW (ref 11.5–15.5)
MCHC RBC-ENTMCNC: 31.2 PG — SIGNIFICANT CHANGE UP (ref 27–34)
MCHC RBC-ENTMCNC: 32 GM/DL — SIGNIFICANT CHANGE UP (ref 32–36)
MCV RBC AUTO: 97.3 FL — SIGNIFICANT CHANGE UP (ref 80–100)
PLATELET # BLD AUTO: 204 K/UL — SIGNIFICANT CHANGE UP (ref 150–400)
POTASSIUM SERPL-MCNC: 3.9 MMOL/L — SIGNIFICANT CHANGE UP (ref 3.5–5.3)
POTASSIUM SERPL-SCNC: 3.9 MMOL/L — SIGNIFICANT CHANGE UP (ref 3.5–5.3)
RBC # BLD: 2.92 M/UL — LOW (ref 3.8–5.2)
RBC # FLD: 13 % — SIGNIFICANT CHANGE UP (ref 10.3–14.5)
SODIUM SERPL-SCNC: 142 MMOL/L — SIGNIFICANT CHANGE UP (ref 135–145)
WBC # BLD: 8.82 K/UL — SIGNIFICANT CHANGE UP (ref 3.8–10.5)
WBC # FLD AUTO: 8.82 K/UL — SIGNIFICANT CHANGE UP (ref 3.8–10.5)

## 2022-02-24 PROCEDURE — 99232 SBSQ HOSP IP/OBS MODERATE 35: CPT

## 2022-02-24 RX ORDER — OMEGA-3 ACID ETHYL ESTERS 1 G
2 CAPSULE ORAL
Qty: 0 | Refills: 0 | DISCHARGE

## 2022-02-24 RX ORDER — DIPHENHYDRAMINE HCL 50 MG
25 CAPSULE ORAL EVERY 4 HOURS
Refills: 0 | Status: DISCONTINUED | OUTPATIENT
Start: 2022-02-24 | End: 2022-02-24

## 2022-02-24 RX ORDER — ZINC SULFATE TAB 220 MG (50 MG ZINC EQUIVALENT) 220 (50 ZN) MG
1 TAB ORAL
Qty: 0 | Refills: 0 | DISCHARGE

## 2022-02-24 RX ORDER — MILK THISTLE 150 MG
1 CAPSULE ORAL
Qty: 0 | Refills: 0 | DISCHARGE

## 2022-02-24 RX ORDER — ACETAMINOPHEN 500 MG
2 TABLET ORAL
Qty: 0 | Refills: 0 | DISCHARGE

## 2022-02-24 RX ADMIN — Medication 325 MILLIGRAM(S): at 09:21

## 2022-02-24 RX ADMIN — CELECOXIB 200 MILLIGRAM(S): 200 CAPSULE ORAL at 09:21

## 2022-02-24 RX ADMIN — Medication 975 MILLIGRAM(S): at 05:45

## 2022-02-24 RX ADMIN — Medication 1 TABLET(S): at 09:21

## 2022-02-24 RX ADMIN — OXYCODONE HYDROCHLORIDE 10 MILLIGRAM(S): 5 TABLET ORAL at 10:20

## 2022-02-24 RX ADMIN — Medication 500 MILLIGRAM(S): at 09:21

## 2022-02-24 RX ADMIN — Medication 975 MILLIGRAM(S): at 13:50

## 2022-02-24 RX ADMIN — PANTOPRAZOLE SODIUM 40 MILLIGRAM(S): 20 TABLET, DELAYED RELEASE ORAL at 05:45

## 2022-02-24 RX ADMIN — OXYCODONE HYDROCHLORIDE 10 MILLIGRAM(S): 5 TABLET ORAL at 09:20

## 2022-02-24 RX ADMIN — Medication 325 MILLIGRAM(S): at 09:34

## 2022-02-24 RX ADMIN — Medication 1 MILLIGRAM(S): at 09:21

## 2022-02-24 RX ADMIN — Medication 25 MILLIGRAM(S): at 12:17

## 2022-02-24 NOTE — PROGRESS NOTE ADULT - SUBJECTIVE AND OBJECTIVE BOX
c/c: left hip pain    HPI: 56F, pmh of anxiety, OA, AVN left hip who is admitted for elective left hip replacement after failing outpt conservative measures. Hospitalist service consulted for medical comanagement.   pt seen and examined on 2N. c/o pain left hip. Had gotten 10mg oxycodone without relief. No sob/chest pain. Was able to ambulate to door with physical therapy. hasn't voided yet.     ROS: all 10 systems reviewed and is as above otherwise negative.     PMH: as above  PSH: bilateral inguinal hernia repair, bunionectomy, ovarian cystectomy, right hip replacement  F/H: mother-colon ca, OA, DM        Father-CAD, CVA, Parkinsons.  Social: no tobacco, occasional etoh  Allergies: cipro, pcn  Home meds: see med rec    2/23/22- was up and ambulated with PT, in a lot pain now after therapy    Vital Signs Last 24 Hrs  T(C): 36.6 (23 Feb 2022 09:07), Max: 36.9 (22 Feb 2022 19:48)  T(F): 97.9 (23 Feb 2022 09:07), Max: 98.4 (22 Feb 2022 19:48)  HR: 89 (23 Feb 2022 09:07) (74 - 92)  BP: 121/67 (23 Feb 2022 09:07) (92/53 - 121/67)  BP(mean): 79 (23 Feb 2022 09:07) (79 - 79)  RR: 16 (23 Feb 2022 09:07) (16 - 18)  SpO2: 97% (23 Feb 2022 09:07) (96% - 99%)    PHYSICAL EXAM:  GENERAL: Comfortable, no acute distress   HEAD:  Normocephalic, atraumatic  EYES: EOMI, PERRLA  HEENT: Moist mucous membranes  NECK: Supple, No JVD  NERVOUS SYSTEM:  Alert & Oriented X3, non focal.   CHEST/LUNG: Clear to auscultation bilaterally  HEART: Regular rate and rhythm  ABDOMEN: Soft, Nontender, Nondistended, Bowel sounds present  GENITOURINARY: Voiding, no palpable bladder  EXTREMITIES:   No clubbing, cyanosis, or edema  MUSCULOSKELETAL-left hip dressing c/d/i  SKIN-no rash    LABS:                        10.7   5.31  )-----------( 221      ( 23 Feb 2022 08:13 )             33.4     23 Feb 2022 08:13    139    |  107    |  11     ----------------------------<  145    4.1     |  26     |  0.70     Ca    8.5        23 Feb 2022 08:13    CAPILLARY BLOOD GLUCOSE  POCT Blood Glucose.: 193 mg/dL (23 Feb 2022 10:30)  POCT Blood Glucose.: 131 mg/dL (22 Feb 2022 21:12)    MEDICATIONS  (STANDING):  acetaminophen     Tablet .. 975 milliGRAM(s) Oral every 8 hours  ascorbic acid 500 milliGRAM(s) Oral two times a day  aspirin enteric coated 325 milliGRAM(s) Oral two times a day  celecoxib 200 milliGRAM(s) Oral every 12 hours  escitalopram 20 milliGRAM(s) Oral at bedtime  ferrous    sulfate 325 milliGRAM(s) Oral daily  folic acid 1 milliGRAM(s) Oral daily  lactated ringers. 1000 milliLiter(s) (75 mL/Hr) IV Continuous <Continuous>  multivitamin 1 Tablet(s) Oral daily  pantoprazole    Tablet 40 milliGRAM(s) Oral before breakfast  polyethylene glycol 3350 17 Gram(s) Oral at bedtime  senna 2 Tablet(s) Oral at bedtime  sodium chloride 0.9% lock flush 3 milliLiter(s) IV Push every 8 hours    MEDICATIONS  (PRN):  HYDROmorphone  Injectable 0.5 milliGRAM(s) SubCutaneous every 4 hours PRN Severe Pain (7 - 10)  ondansetron Injectable 8 milliGRAM(s) IV Push every 8 hours PRN Nausea and/or Vomiting  oxyCODONE    IR 5 milliGRAM(s) Oral every 4 hours PRN Mild Pain (1 - 3)  oxyCODONE    IR 10 milliGRAM(s) Oral every 4 hours PRN Moderate Pain (4 - 6)  prochlorperazine   Injectable 10 milliGRAM(s) IV Push every 8 hours PRN Nausea and/or Vomiting      ASSESSMENT AND PLAN:  56f, PMH as above a/w:    1. AVN Left hip s/p left hip replacement:  -POD#1  -pain control with prn dilaudid/oxycodone/tylenol  -physical therapy   -incentive spirometry  -bowel regimen.     2. Anxiety  -continue lexapro    3. DVT px:  -Aspirin BID    4. Dispo- likely home with home PT tomorrow    
c/c: left hip pain    HPI: 56F, pmh of anxiety, OA, AVN left hip who is admitted for elective left hip replacement after failing outpt conservative measures. Hospitalist service consulted for medical comanagement.   pt seen and examined on 2N. c/o pain left hip. Had gotten 10mg oxycodone without relief. No sob/chest pain. Was able to ambulate to door with physical therapy. hasn't voided yet.     ROS: all 10 systems reviewed and is as above otherwise negative.     PMH: as above  PSH: bilateral inguinal hernia repair, bunionectomy, ovarian cystectomy, right hip replacement  F/H: mother-colon ca, OA, DM        Father-CAD, CVA, Parkinsons.  Social: no tobacco, occasional etoh  Allergies: cipro, pcn  Home meds: see med rec    2/23/22- was up and ambulated with PT, in a lot pain now after therapy  2/24/22 pain is better controlled. Doing good, wants to go home after 2nd therapy session    Vital Signs Last 24 Hrs  T(C): 36.8 (24 Feb 2022 08:37), Max: 36.8 (24 Feb 2022 00:26)  T(F): 98.2 (24 Feb 2022 08:37), Max: 98.2 (24 Feb 2022 00:26)  HR: 64 (24 Feb 2022 08:37) (64 - 77)  BP: 109/63 (24 Feb 2022 08:37) (108/45 - 115/53)  BP(mean): 74 (24 Feb 2022 08:37) (74 - 74)  RR: 16 (24 Feb 2022 08:37) (16 - 16)  SpO2: 99% (24 Feb 2022 08:37) (94% - 99%)    PHYSICAL EXAM:  GENERAL: Comfortable, no acute distress   HEAD:  Normocephalic, atraumatic  EYES: EOMI, PERRLA  HEENT: Moist mucous membranes  NECK: Supple, No JVD  NERVOUS SYSTEM:  Alert & Oriented X3, non focal.   CHEST/LUNG: Clear to auscultation bilaterally  HEART: Regular rate and rhythm  ABDOMEN: Soft, Nontender, Nondistended, Bowel sounds present  GENITOURINARY: Voiding, no palpable bladder  EXTREMITIES:   No clubbing, cyanosis, or edema  MUSCULOSKELETAL-left hip dressing c/d/i  SKIN-no rash    LABS:                        9.1    8.82  )-----------( 204      ( 24 Feb 2022 07:14 )             28.4     24 Feb 2022 07:14    142    |  109    |  16     ----------------------------<  128    3.9     |  26     |  0.62     Ca    8.4        24 Feb 2022 07:14    CAPILLARY BLOOD GLUCOSE  POCT Blood Glucose.: 155 mg/dL (23 Feb 2022 23:14)    MEDICATIONS  (STANDING):  acetaminophen     Tablet .. 975 milliGRAM(s) Oral every 8 hours  ascorbic acid 500 milliGRAM(s) Oral two times a day  aspirin enteric coated 325 milliGRAM(s) Oral two times a day  celecoxib 200 milliGRAM(s) Oral every 12 hours  escitalopram 20 milliGRAM(s) Oral at bedtime  ferrous    sulfate 325 milliGRAM(s) Oral daily  folic acid 1 milliGRAM(s) Oral daily  lactated ringers. 1000 milliLiter(s) (75 mL/Hr) IV Continuous <Continuous>  multivitamin 1 Tablet(s) Oral daily  pantoprazole    Tablet 40 milliGRAM(s) Oral before breakfast  polyethylene glycol 3350 17 Gram(s) Oral at bedtime  senna 2 Tablet(s) Oral at bedtime  sodium chloride 0.9% lock flush 3 milliLiter(s) IV Push every 8 hours    MEDICATIONS  (PRN):  HYDROmorphone  Injectable 0.5 milliGRAM(s) SubCutaneous every 4 hours PRN Severe Pain (7 - 10)  ondansetron Injectable 8 milliGRAM(s) IV Push every 8 hours PRN Nausea and/or Vomiting  oxyCODONE    IR 5 milliGRAM(s) Oral every 4 hours PRN Mild Pain (1 - 3)  oxyCODONE    IR 10 milliGRAM(s) Oral every 4 hours PRN Moderate Pain (4 - 6)  prochlorperazine   Injectable 10 milliGRAM(s) IV Push every 8 hours PRN Nausea and/or Vomiting      ASSESSMENT AND PLAN:  56f, PMH as above a/w:    1. AVN Left hip s/p left hip replacement  -POD#2  -pain control with prn dilaudid/oxycodone/tylenol  -physical therapy   -incentive spirometry  -bowel regimen.     #Anemia acute blood loss postop  HH stable for discharge    2. Anxiety  -continue lexapro    3. DVT px:  -Aspirin BID    4. Dispo- likely home with home PT later today.     
Orthopedics  POD 1 s/p L RACHEL    Patient seen and examined at bedside. Complaining of pain since the block wore off but overall controlled.  No nausea or vomiting, C/P, SOB. No overnight events per nursing.      Vital Signs Last 24 Hrs  T(C): 36.5 (23 Feb 2022 05:20), Max: 37.2 (22 Feb 2022 11:45)  T(F): 97.7 (23 Feb 2022 05:20), Max: 98.9 (22 Feb 2022 11:45)  HR: 92 (23 Feb 2022 05:20) (66 - 100)  BP: 100/51 (23 Feb 2022 05:20) (92/53 - 137/89)  BP(mean): --  RR: 18 (23 Feb 2022 05:20) (14 - 22)  SpO2: 96% (23 Feb 2022 05:20) (96% - 100%)                          11.9   6.18  )-----------( 229      ( 22 Feb 2022 10:00 )             36.9         Exam:  Gen: NAD, resting comfortably  LLE  Dressing c/d/i, abduction pillow in place  +EHL/FHL/TA/GS  SILT deep per/sup per/ saph/sural  +DP  Calf NTTP b/l  Compartments soft and compressible    A/P:  56yFemale Stable POD 1  s/p L RACHEL    Medical comanagement appreciated  abduction pillow when sitting/lying. posterior hip precautions  -FU AM labs  -WBAT LLE  -Pain control PRN  -PT  -Ppx ABX x24hrs  -DVT PE ppx: Aspirin per AC team recs  -Incentive spirometry  -Dispo: Home per PT recs  -Will discuss with attending Dr. Rowland and advise if plan changes
Orthopedics  POD 2 s/p L RACHEL    Patient seen and examined at bedside. patient states pain is well controlled. She has been walking with PT.  No nausea or vomiting, C/P, SOB. No overnight events per nursing.      Vital Signs Last 24 Hrs  T(C): 36.8 (24 Feb 2022 00:26), Max: 36.8 (24 Feb 2022 00:26)  T(F): 98.2 (24 Feb 2022 00:26), Max: 98.2 (24 Feb 2022 00:26)  HR: 74 (24 Feb 2022 00:26) (74 - 89)  BP: 108/45 (24 Feb 2022 00:26) (108/45 - 121/67)  BP(mean): 79 (23 Feb 2022 09:07) (79 - 79)  RR: 16 (24 Feb 2022 00:26) (16 - 16)  SpO2: 96% (24 Feb 2022 00:26) (94% - 97%)    Exam:  Gen: NAD, resting comfortably  LLE  Dressing c/d/i, abduction pillow in place  +EHL/FHL/TA/GS  SILT deep per/sup per/ saph/sural  +DP  Calf NTTP b/l  Compartments soft and compressible    A/P:  56yFemale Stable POD 2  s/p L RACHEL    Medical comanagement appreciated  abduction pillow when sitting/lying. posterior hip precautions  -FU AM labs  -WBAT LLE  -Pain control PRN  -PT  -DVT PE ppx: Aspirin per AC team recs  -Incentive spirometry  -Dispo: Home per PT recs  -Will discuss with attending Dr. Rowland and advise if plan changes
Orthopedics Post-op Check  POD 0  Patient seen and examined at bedside. Pain is controlled. Pt feeling well. No nausea or vomiting, C/P, SOB.    Vital Signs Last 24 Hrs  T(C): 37.2 (02-22-22 @ 11:45), Max: 37.2 (02-22-22 @ 11:45)  T(F): 98.9 (02-22-22 @ 11:45), Max: 98.9 (02-22-22 @ 11:45)  HR: 100 (02-22-22 @ 11:45) (66 - 100)  BP: 137/89 (02-22-22 @ 11:45) (94/60 - 137/89)  RR: 18 (02-22-22 @ 11:45) (14 - 22)  SpO2: 99% (02-22-22 @ 11:45) (97% - 100%)                        11.9   6.18  )-----------( 229      ( 22 Feb 2022 10:00 )             36.9     22 Feb 2022 09:14    137    |  112    |  14     ----------------------------<  107    3.7     |  19     |  0.60     Ca    8.0        22 Feb 2022 09:14          Exam:  Gen: NAD, resting comfortably  LLE  Dressing c/d/i, abduction pillow in place and SCDs  +EHL/FHL/TA/GS  SILT deep per/sup per/ saph/sural  +DP  Calf NTTP b/l  Compartments soft and compressible    A/P:  56yFemale Stable POD 0  s/p L RACHEL    abduction pillow when sitting/lying. posterior hip precautions  -FU labs  -WBAT LLE  -Pain control  -PT/OT  -Ppx ABX x24hrs  -DVT PE ppx- hold until POD1, then per AC team recs  -Incentive spirometry
Orthopedics Progress Note:    This is a 57y/o Female s/p Left Total Hip Arthroplasty POD 1.  Pt c/o pain. Pt otherwise feeling well. No nausea or vomiting. Pt was up OOB today with PT.    Vital Signs Last 24 Hrs  T(C): 36.6 (02-23-22 @ 09:07), Max: 36.9 (02-22-22 @ 19:48)  T(F): 97.9 (02-23-22 @ 09:07), Max: 98.4 (02-22-22 @ 19:48)  HR: 89 (02-23-22 @ 09:07) (74 - 92)  BP: 121/67 (02-23-22 @ 09:07) (92/53 - 121/67)  BP(mean): 79 (02-23-22 @ 09:07) (79 - 79)  RR: 16 (02-23-22 @ 09:07) (16 - 18)  SpO2: 97% (02-23-22 @ 09:07) (96% - 99%)                        10.7   5.31  )-----------( 221      ( 23 Feb 2022 08:13 )             33.4     23 Feb 2022 08:13    139    |  107    |  11     ----------------------------<  145    4.1     |  26     |  0.70     Ca    8.5        23 Feb 2022 08:13          Exam:  NAD AAOx3.  Dressing clean, dry and intact.  SCDs and abduction pillow in place.  Calves are soft and nontender.  Moving all toes and ankle, +EHL/FHL/TA/GS.  SILT throughout.  DP and PT pulses 2+.    A/P:  Stable POD 1 Left Total Hip Arthroplasty  -Analgesia; 1 time dose of Toradol 30mg IV ordered  -Ice application  -DVT PE prophylaxis  -Incentive spirometry  -OOB PT WBAT LLE with hip precautions  -Abduction pillow  -Discharge planning home; likely tomorrow      
  HPI: This is a 56 years old female with avascular necrosis. Report soft tissue injury to left hip. Injured herself on exercise machine. Soft tissue healed peg pain persist. Saw Dr. Rowland. X-rays of left hip done indicating "joint issue" and decrease cartilage She did physical therapy. Pain persist in different areas of the cyst. She went back to Dr. Rowland. Additional x-rays indicate more cartilage decrease and the development of a cyst in the joint.  Pt admitted to [facility] for planned left total hip replacement.  Right hip replaced in the past.         Patient is a 56y old  Female who presents with a chief complaint of Left Total Hip Arthroplasty (2022 08:04) s/p left THR  on 22.       Consulted by Dr. Jay Rowland   for VTE prophylaxis, risk stratification, and anticoagulation management.    PAST MEDICAL & SURGICAL HISTORY:  Anxiety    History of depression  Off Wellbutrin    Osteoarthritis    Avascular necrosis    History of colon polyps    Breast cyst  followed by gyn.    History of total hip replacement, right  2016    History of colonoscopy with polypectomy  last done Summer 2021    History of bunionectomy  right x 2, left x 1. In     History of ovarian cystectomy      H/O inguinal hernia repair  bilateral 1969        FAMILY HISTORY:  Family hx of colon cancer (Mother)    FH: type 2 diabetes (Mother)    Family history of osteoarthritis (Mother)    Family history of stroke (Father)    Family history of parkinsonism (Father)    FH: CAD (coronary artery disease) (Father)    History of hypertension in sibling (Sibling)        Interval Note:  2022: Pt seen at bedside on [facility].  Discussed with her the need for anticoagulation.  discussed the use of enteric coated aspirin 325mg tab one twice a day for 4 weeks. Pt has no concerns and states she understands the need.      2022 Pt seen at on [facility] oob in chair.  State she hip is hurting a lot.  RN will bring her pain meds. Discussed the use of entric coated aspirin as her anticoagulation med. No concerns. Home when discharged.       CAPRINI SCORE  AGE RELATED RISK FACTORS                                                       MOBILITY RELATED FACTORS  [x ] Age 41-60 years                                            (1 Point)                  [ ] Bed rest                                                        (1 Point)  [ ] Age: 61-74 years                                           (2 Points)                [ ] Plaster cast                                                   (2 Points)  [ ] Age= 75 years                                              (3 Points)                 [ ] Bed bound for more than 72 hours                   (2 Points)    DISEASE RELATED RISK FACTORS                                               GENDER SPECIFIC FACTORS  [ ] Edema in the lower extremities                       (1 Point)           [ ] Pregnancy                                                            (1 Point)  [ ] Varicose veins                                               (1 Point)                  [ ] Post-partum < 6 weeks                                      (1 Point)             [x ] BMI > 25 Kg/m2                                            (1 Point)                  [ ] Hormonal therapy or oral contraception       (1 Point)                 [ ] Sepsis (in the previous month)                        (1 Point)             [ ] History of pregnancy complications                (1Point)  [ ] Pneumonia or serious lung disease                                             [ ] Unexplained or recurrent  (=/>3), premature                                 (In the previous month)                               (1 Point)                birth with toxemia or growth-restricted infant (1 Point)  [ ] Abnormal pulmonary function test            (1 Point)                                   SURGERY RELATED RISK FACTORS  [ ] Acute myocardial infarction                       (1 Point)                  [ ]  Section                                         (1 Point)  [ ] Congestive heart failure (in the previous month) (1 Point)   [ ] Minor surgery   lasting <45 minutes       (1 Point)   [ ] Inflammatory bowel disease                             (1 Point)          [ ] Arthroscopic surgery                                  (2 Points)  [ ] Central venous access                                    (2 Points)            [ ] General surgery lasting >45 minutes      (2 Points)       [ ] Stroke (in the previous month)                  (5 Points)            [x ] Elective major lower extremity arthroplasty (5 Points)                                   [  ] Malignancy (present or past include skin melanoma                                          but exclude  basal skin cell)    (2 points)                                      TRAUMA RELATED RISK FACTORS                HEMATOLOGY RELATED FACTORS                                  [ ] Fracture of the hip, pelvis, or leg                       (5 Points)  [ ] Prior episodes of VTE                                     (3 Points)          [ ] Acute spinal cord injury (in the previous month)  (5 Points)  [ ] Positive family history for VTE                         (3 Points)       [ ] Paralysis (less than 1 month)                          (5 Points)  [ ] Prothrombin 58082 A                                      (3 Points)         [ ] Multiple Trauma (within 1month)                 (5Points)                                                                                                                                                                [ ] Factor V Leiden                                          (3 Points)                                OTHER RISK FACTORS                          [ ] Lupus anticoagulants                                     (3 Points)                       [ ] BMI > 40                          (1 Point)                                                         [ ] Anticardiolipin antibodies                                (3 Points)                   [ ] Smoking                              (1Point)                                                [ ] High homocysteine in the blood                      (3 Points)                [  ] Diabetes requiring insulin (1point)                         [ ] Other congenital or acquired thrombophilia       (3 Points)          [  ] Chemotherapy                   (1 Point)  [ ] Heparin induced thrombocytopenia                  (3 Points)             [  ] Blood Transfusion                (1 point)                                                                                                             Total Score [7]                                                                                                                                                                                                                                                                                                                                                                                                                                            IMPROVE Bleeding Risk Score 1.5    Falls Risk:   High (x  )  Mod (  )  Low (  )  crcl: 108.2        cr: .60         BMI: 28.3            EBL:  150 ml  Time procedure    : starts:  7:55            :ends: 8:40      Tourniquet time:        Denies any personal or familial history of clotting or bleeding disorders.    Allergies    ciprofloxacin (Hives)  penicillins (Hives)    Intolerances        REVIEW OF SYSTEMS    (  )Fever	     (  )Constipation	(  )SOB				(  )Headache	(  )Dysuria  (  )Chills	     (  )Melena	(  )Dyspnea present on exertion	                    (  )Dizziness                    (  )Polyuria  (  )Nausea	     (  )Hematochezia	(  )Cough			                    (  )Syncope   	(  )Hematuria  (  )Vomiting    (  )Chest Pain	(  )Wheezing			(  )Weakness  (  )Diarrhea     (  )Palpitations	(  )Anorexia			(x  )Myalgia  (x) hip pain    Pertinent positives in HPI and daily subjective.  All other ROS negative.      Vital Signs Last 24 Hrs  T(C): 36.6 (22 @ 09:07), Max: 36.9 (22 @ 19:48)  T(F): 97.9 (22 @ 09:07), Max: 98.4 (22 @ 19:48)  HR: 89 (22 @ 09:07) (74 - 92)  BP: 121/67 (22 @ 09:07) (92/53 - 121/67)  BP(mean): 79 (22 @ 09:07) (79 - 79)  RR: 16 (22 @ 09:07) (16 - 18)  SpO2: 97% (22 @ 09:07) (96% - 99%)    PHYSICAL EXAM:    Constitutional: Appears Well    Neurological: A& O x 3    Skin: Warm    Respiratory and Thorax: normal effort; Breath sounds: normal; No rales/wheezing/rhonchi  	  Cardiovascular: S1, S2, regular, NMBR	    Gastrointestinal: BS + x 4Q, nontender	    Genitourinary:  Bladder nondistended, nontender    Musculoskeletal:   General Right:   no muscle/joint tenderness,   normal tone, no joint swelling,   ROM: full	    General Left:   no muscle/joint tenderness,   normal tone, no joint swelling,   ROM: limited    Hip:  Left: Dressing CDI;     Lower extrems:   Right: no calf tenderness              negative kailyn's sign               + pedal pulses    Left:   no calf tenderness              negative kailyn's sign               + pedal pulses                                  10.7   5.31  )-----------( 221      ( 2022 08:13 )             33.4       02-23    139  |  107  |  11  ----------------------------<  145<H>  4.1   |  26  |  0.70    Ca    8.5      2022 08:13                      11.9   6.18  )-----------( 229      ( 2022 10:00 )             36.9       02-22    137  |  112<H>  |  14  ----------------------------<  107<H>  3.7   |  19<L>  |  0.60    Ca    8.0<L>      2022 09:14        				    MEDICATIONS  (STANDING):  acetaminophen     Tablet .. 975 milliGRAM(s) Oral every 8 hours  ascorbic acid 500 milliGRAM(s) Oral two times a day  aspirin enteric coated 325 milliGRAM(s) Oral two times a day  celecoxib 200 milliGRAM(s) Oral every 12 hours  escitalopram 20 milliGRAM(s) Oral at bedtime  ferrous    sulfate 325 milliGRAM(s) Oral daily  folic acid 1 milliGRAM(s) Oral daily  ketorolac   Injectable 30 milliGRAM(s) IV Push once  lactated ringers. 1000 milliLiter(s) IV Continuous <Continuous>  multivitamin 1 Tablet(s) Oral daily  pantoprazole    Tablet 40 milliGRAM(s) Oral before breakfast  polyethylene glycol 3350 17 Gram(s) Oral at bedtime  senna 2 Tablet(s) Oral at bedtime  sodium chloride 0.9% lock flush 3 milliLiter(s) IV Push every 8 hours            DVT Prophylaxis:  LMWH                   (  )  Heparin SQ           (  )  Coumadin             (  )  Xarelto                  (  )  Eliquis                   (  )  Venodynes           ( x )  Ambulation          (x  )  UFH                       (  )  Contraindicated  (  )  EC Aspirin             ( x )

## 2022-02-25 PROBLEM — Z86.59 PERSONAL HISTORY OF OTHER MENTAL AND BEHAVIORAL DISORDERS: Chronic | Status: ACTIVE | Noted: 2022-02-09

## 2022-02-25 PROBLEM — M19.90 UNSPECIFIED OSTEOARTHRITIS, UNSPECIFIED SITE: Chronic | Status: ACTIVE | Noted: 2022-02-09

## 2022-02-25 PROBLEM — M87.00 IDIOPATHIC ASEPTIC NECROSIS OF UNSPECIFIED BONE: Chronic | Status: ACTIVE | Noted: 2022-02-09

## 2022-02-25 PROBLEM — Z86.010 PERSONAL HISTORY OF COLONIC POLYPS: Chronic | Status: ACTIVE | Noted: 2022-02-09

## 2022-02-25 PROBLEM — Z86.010 PERSONAL HISTORY OF COLON POLYPS: Chronic | Status: ACTIVE | Noted: 2022-02-09

## 2022-02-25 PROBLEM — N60.09 SOLITARY CYST OF UNSPECIFIED BREAST: Chronic | Status: ACTIVE | Noted: 2022-02-09

## 2022-02-26 DIAGNOSIS — M16.12 UNILATERAL PRIMARY OSTEOARTHRITIS, LEFT HIP: ICD-10-CM

## 2022-02-26 DIAGNOSIS — F41.9 ANXIETY DISORDER, UNSPECIFIED: ICD-10-CM

## 2022-03-15 ENCOUNTER — APPOINTMENT (OUTPATIENT)
Dept: ORTHOPEDIC SURGERY | Facility: CLINIC | Age: 57
End: 2022-03-15
Payer: COMMERCIAL

## 2022-03-15 VITALS
HEART RATE: 85 BPM | SYSTOLIC BLOOD PRESSURE: 105 MMHG | HEIGHT: 60 IN | WEIGHT: 140 LBS | BODY MASS INDEX: 27.48 KG/M2 | DIASTOLIC BLOOD PRESSURE: 70 MMHG

## 2022-03-15 PROCEDURE — 99024 POSTOP FOLLOW-UP VISIT: CPT

## 2022-03-15 PROCEDURE — 73502 X-RAY EXAM HIP UNI 2-3 VIEWS: CPT | Mod: LT

## 2022-03-17 NOTE — PHYSICAL EXAM
[de-identified] : Left hip:\par Hip: Range of Motion in Degrees:\par 	                                 Claimant:	   Normal:	\par Flexion (Active) 	                 120 	   120-degrees	\par Flexion (Passive)	                 120	   120-degrees	\par Extension (Active)	                 -30	   -30-degrees	\par Extension (Passive)	 -30	   -30-degrees	\par Abduction (Active)	                 45-50	   [id number]	\par Abduction (Passive)	 45-50	   [id number]	\par Adduction (Active)  	 20-30	   [id number]	\par Adduction (Passive)	 20-30	   [id number]	\par Internal Rotation (Active) 	 35	   35-degrees	\par Internal Rotation (Passive)	 35	   35-degrees	\par External Rotation (Active)	 45	   45-degrees	\par External Rotation (Passive)	 45	   45-degrees	 \par \par No calf tenderness.  Good distal pulses.  The incision is clean, dry and intact.  No signs of infection.   [de-identified] : She walks pretty much with a normal gait pattern.  She has the assistance of a cane, but she was not using it. [de-identified] : Appearance: Well-developed, well-nourished female  in no acute distress.  [de-identified] : X-ray examination, two to three views of the left hip, including pelvis, reveals adequate alignment of the arthroplasty.

## 2022-03-17 NOTE — DISCUSSION/SUMMARY
[de-identified] : The patient presents status post left total hip arthroplasty.  The patient will continue physical therapy and return to the office in six weeks.

## 2022-03-17 NOTE — HISTORY OF PRESENT ILLNESS
[de-identified] : Ro comes in today status post a left hip replacement from February.  She is doing excellent.

## 2022-03-17 NOTE — ADDENDUM
[FreeTextEntry1] : This note was written by Radha Hdz on 03/17/2022 acting as scribe for Melody Oliva, JAREDR/IVÁN, PA.\par

## 2022-03-24 ENCOUNTER — APPOINTMENT (OUTPATIENT)
Dept: ORTHOPEDIC SURGERY | Facility: CLINIC | Age: 57
End: 2022-03-24
Payer: COMMERCIAL

## 2022-03-24 PROCEDURE — 99024 POSTOP FOLLOW-UP VISIT: CPT

## 2022-03-24 PROCEDURE — 73502 X-RAY EXAM HIP UNI 2-3 VIEWS: CPT | Mod: LT

## 2022-03-29 ENCOUNTER — APPOINTMENT (OUTPATIENT)
Dept: ORTHOPEDIC SURGERY | Facility: CLINIC | Age: 57
End: 2022-03-29

## 2022-03-30 ENCOUNTER — APPOINTMENT (OUTPATIENT)
Dept: ORTHOPEDIC SURGERY | Facility: CLINIC | Age: 57
End: 2022-03-30

## 2022-03-30 NOTE — HISTORY OF PRESENT ILLNESS
[de-identified] : Postop Left Hip [de-identified] : The patient comes in today for her left hip.  She states she was doing great and then developed a lot of muscle spasm anteriorly and to the side of her hip.\par  [de-identified] : Left Hip:  No tenderness with internal or external rotation or axial load.  She does have trochanteric bursitis.  Mild tenderness with hip flexion.  \par \par   [de-identified] : Radiographs, two to three views of the left hip and pelvis, show acceptable position of the implant.\par  [de-identified] : Status post left total hip [de-identified] : At this time, since the patient is status post left total hip, we instructed her to discontinue therapy, walking only, total hip precautions and reassessment in 2 weeks.\par

## 2022-03-30 NOTE — ADDENDUM
[FreeTextEntry1] : This note was written by Nazario Gomez on 03/30/2022, acting as a scribe for Jay Rowland III, MD

## 2022-04-08 ENCOUNTER — APPOINTMENT (OUTPATIENT)
Dept: ORTHOPEDIC SURGERY | Facility: CLINIC | Age: 57
End: 2022-04-08
Payer: COMMERCIAL

## 2022-04-08 PROCEDURE — 99024 POSTOP FOLLOW-UP VISIT: CPT

## 2022-04-13 ENCOUNTER — EMERGENCY (EMERGENCY)
Facility: HOSPITAL | Age: 57
LOS: 0 days | Discharge: HOME | End: 2022-04-13
Attending: EMERGENCY MEDICINE
Payer: COMMERCIAL

## 2022-04-13 VITALS
SYSTOLIC BLOOD PRESSURE: 116 MMHG | HEART RATE: 85 BPM | OXYGEN SATURATION: 100 % | TEMPERATURE: 99 F | DIASTOLIC BLOOD PRESSURE: 92 MMHG | WEIGHT: 141.98 LBS | HEIGHT: 60 IN | RESPIRATION RATE: 16 BRPM

## 2022-04-13 VITALS
TEMPERATURE: 98 F | DIASTOLIC BLOOD PRESSURE: 51 MMHG | HEART RATE: 80 BPM | SYSTOLIC BLOOD PRESSURE: 108 MMHG | OXYGEN SATURATION: 100 % | RESPIRATION RATE: 16 BRPM

## 2022-04-13 DIAGNOSIS — Z96.641 PRESENCE OF RIGHT ARTIFICIAL HIP JOINT: Chronic | ICD-10-CM

## 2022-04-13 DIAGNOSIS — Z98.890 OTHER SPECIFIED POSTPROCEDURAL STATES: Chronic | ICD-10-CM

## 2022-04-13 DIAGNOSIS — M19.90 UNSPECIFIED OSTEOARTHRITIS, UNSPECIFIED SITE: ICD-10-CM

## 2022-04-13 DIAGNOSIS — Z87.19 PERSONAL HISTORY OF OTHER DISEASES OF THE DIGESTIVE SYSTEM: ICD-10-CM

## 2022-04-13 DIAGNOSIS — Z87.39 PERSONAL HISTORY OF OTHER DISEASES OF THE MUSCULOSKELETAL SYSTEM AND CONNECTIVE TISSUE: ICD-10-CM

## 2022-04-13 DIAGNOSIS — Z20.822 CONTACT WITH AND (SUSPECTED) EXPOSURE TO COVID-19: ICD-10-CM
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
## 2022-04-13 DIAGNOSIS — Z87.42 PERSONAL HISTORY OF OTHER DISEASES OF THE FEMALE GENITAL TRACT: ICD-10-CM

## 2022-04-13 DIAGNOSIS — M25.552 PAIN IN LEFT HIP: ICD-10-CM

## 2022-04-13 DIAGNOSIS — Z79.82 LONG TERM (CURRENT) USE OF ASPIRIN: ICD-10-CM

## 2022-04-13 DIAGNOSIS — T84.021A DISLOCATION OF INTERNAL LEFT HIP PROSTHESIS, INITIAL ENCOUNTER: ICD-10-CM

## 2022-04-13 DIAGNOSIS — Z82.61 FAMILY HISTORY OF ARTHRITIS: ICD-10-CM

## 2022-04-13 DIAGNOSIS — M87.9 OSTEONECROSIS, UNSPECIFIED: ICD-10-CM

## 2022-04-13 DIAGNOSIS — Z88.1 ALLERGY STATUS TO OTHER ANTIBIOTIC AGENTS STATUS: ICD-10-CM

## 2022-04-13 DIAGNOSIS — F41.9 ANXIETY DISORDER, UNSPECIFIED: ICD-10-CM

## 2022-04-13 DIAGNOSIS — Z88.0 ALLERGY STATUS TO PENICILLIN: ICD-10-CM

## 2022-04-13 DIAGNOSIS — Z96.642 PRESENCE OF LEFT ARTIFICIAL HIP JOINT: ICD-10-CM

## 2022-04-13 DIAGNOSIS — F32.A DEPRESSION, UNSPECIFIED: ICD-10-CM
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
## 2022-04-13 DIAGNOSIS — Z86.010 PERSONAL HISTORY OF COLONIC POLYPS: ICD-10-CM

## 2022-04-13 LAB
ALBUMIN SERPL ELPH-MCNC: 3.9 G/DL — SIGNIFICANT CHANGE UP (ref 3.3–5)
ALP SERPL-CCNC: 58 U/L — SIGNIFICANT CHANGE UP (ref 40–120)
ALT FLD-CCNC: 18 U/L — SIGNIFICANT CHANGE UP (ref 12–78)
ANION GAP SERPL CALC-SCNC: 6 MMOL/L — SIGNIFICANT CHANGE UP (ref 5–17)
AST SERPL-CCNC: 19 U/L — SIGNIFICANT CHANGE UP (ref 15–37)
BASOPHILS # BLD AUTO: 0.03 K/UL — SIGNIFICANT CHANGE UP (ref 0–0.2)
BASOPHILS NFR BLD AUTO: 0.7 % — SIGNIFICANT CHANGE UP (ref 0–2)
BILIRUB SERPL-MCNC: 0.6 MG/DL — SIGNIFICANT CHANGE UP (ref 0.2–1.2)
BUN SERPL-MCNC: 20 MG/DL — SIGNIFICANT CHANGE UP (ref 7–23)
CALCIUM SERPL-MCNC: 9 MG/DL — SIGNIFICANT CHANGE UP (ref 8.5–10.1)
CHLORIDE SERPL-SCNC: 110 MMOL/L — HIGH (ref 96–108)
CO2 SERPL-SCNC: 26 MMOL/L — SIGNIFICANT CHANGE UP (ref 22–31)
CREAT SERPL-MCNC: 0.64 MG/DL — SIGNIFICANT CHANGE UP (ref 0.5–1.3)
EGFR: 104 ML/MIN/1.73M2 — SIGNIFICANT CHANGE UP
EOSINOPHIL # BLD AUTO: 0.17 K/UL — SIGNIFICANT CHANGE UP (ref 0–0.5)
EOSINOPHIL NFR BLD AUTO: 4.1 % — SIGNIFICANT CHANGE UP (ref 0–6)
GLUCOSE SERPL-MCNC: 109 MG/DL — HIGH (ref 70–99)
HCT VFR BLD CALC: 37.1 % — SIGNIFICANT CHANGE UP (ref 34.5–45)
HGB BLD-MCNC: 11.7 G/DL — SIGNIFICANT CHANGE UP (ref 11.5–15.5)
IMM GRANULOCYTES NFR BLD AUTO: 0.2 % — SIGNIFICANT CHANGE UP (ref 0–1.5)
LYMPHOCYTES # BLD AUTO: 1.25 K/UL — SIGNIFICANT CHANGE UP (ref 1–3.3)
LYMPHOCYTES # BLD AUTO: 30.3 % — SIGNIFICANT CHANGE UP (ref 13–44)
MCHC RBC-ENTMCNC: 30.8 PG — SIGNIFICANT CHANGE UP (ref 27–34)
MCHC RBC-ENTMCNC: 31.5 GM/DL — LOW (ref 32–36)
MCV RBC AUTO: 97.6 FL — SIGNIFICANT CHANGE UP (ref 80–100)
MONOCYTES # BLD AUTO: 0.24 K/UL — SIGNIFICANT CHANGE UP (ref 0–0.9)
MONOCYTES NFR BLD AUTO: 5.8 % — SIGNIFICANT CHANGE UP (ref 2–14)
NEUTROPHILS # BLD AUTO: 2.43 K/UL — SIGNIFICANT CHANGE UP (ref 1.8–7.4)
NEUTROPHILS NFR BLD AUTO: 58.9 % — SIGNIFICANT CHANGE UP (ref 43–77)
PLATELET # BLD AUTO: 266 K/UL — SIGNIFICANT CHANGE UP (ref 150–400)
POTASSIUM SERPL-MCNC: 3.7 MMOL/L — SIGNIFICANT CHANGE UP (ref 3.5–5.3)
POTASSIUM SERPL-SCNC: 3.7 MMOL/L — SIGNIFICANT CHANGE UP (ref 3.5–5.3)
PROT SERPL-MCNC: 6.5 GM/DL — SIGNIFICANT CHANGE UP (ref 6–8.3)
RBC # BLD: 3.8 M/UL — SIGNIFICANT CHANGE UP (ref 3.8–5.2)
RBC # FLD: 13.4 % — SIGNIFICANT CHANGE UP (ref 10.3–14.5)
SODIUM SERPL-SCNC: 142 MMOL/L — SIGNIFICANT CHANGE UP (ref 135–145)
WBC # BLD: 4.13 K/UL — SIGNIFICANT CHANGE UP (ref 3.8–10.5)
WBC # FLD AUTO: 4.13 K/UL — SIGNIFICANT CHANGE UP (ref 3.8–10.5)

## 2022-04-13 PROCEDURE — 99285 EMERGENCY DEPT VISIT HI MDM: CPT | Mod: 25

## 2022-04-13 PROCEDURE — 99157 MOD SED OTHER PHYS/QHP EA: CPT

## 2022-04-13 PROCEDURE — 99213 OFFICE O/P EST LOW 20 MIN: CPT

## 2022-04-13 PROCEDURE — 96375 TX/PRO/DX INJ NEW DRUG ADDON: CPT | Mod: XU

## 2022-04-13 PROCEDURE — 99284 EMERGENCY DEPT VISIT MOD MDM: CPT | Mod: 25

## 2022-04-13 PROCEDURE — 85025 COMPLETE CBC W/AUTO DIFF WBC: CPT

## 2022-04-13 PROCEDURE — 73502 X-RAY EXAM HIP UNI 2-3 VIEWS: CPT | Mod: 26,LT

## 2022-04-13 PROCEDURE — 99152 MOD SED SAME PHYS/QHP 5/>YRS: CPT | Mod: XU

## 2022-04-13 PROCEDURE — 80053 COMPREHEN METABOLIC PANEL: CPT

## 2022-04-13 PROCEDURE — 73502 X-RAY EXAM HIP UNI 2-3 VIEWS: CPT | Mod: LT

## 2022-04-13 PROCEDURE — U0005: CPT

## 2022-04-13 PROCEDURE — 27266 TREAT HIP DISLOCATION: CPT | Mod: LT

## 2022-04-13 PROCEDURE — 96374 THER/PROPH/DIAG INJ IV PUSH: CPT | Mod: XU

## 2022-04-13 PROCEDURE — U0003: CPT

## 2022-04-13 PROCEDURE — 96376 TX/PRO/DX INJ SAME DRUG ADON: CPT | Mod: XU

## 2022-04-13 PROCEDURE — 36415 COLL VENOUS BLD VENIPUNCTURE: CPT

## 2022-04-13 PROCEDURE — 99153 MOD SED SAME PHYS/QHP EA: CPT | Mod: XU

## 2022-04-13 PROCEDURE — 99156 MOD SED OTH PHYS/QHP 5/>YRS: CPT

## 2022-04-13 RX ORDER — MORPHINE SULFATE 50 MG/1
4 CAPSULE, EXTENDED RELEASE ORAL ONCE
Refills: 0 | Status: DISCONTINUED | OUTPATIENT
Start: 2022-04-13 | End: 2022-04-13

## 2022-04-13 RX ORDER — PROPOFOL 10 MG/ML
40 INJECTION, EMULSION INTRAVENOUS ONCE
Refills: 0 | Status: COMPLETED | OUTPATIENT
Start: 2022-04-13 | End: 2022-04-13

## 2022-04-13 RX ORDER — FENTANYL CITRATE 50 UG/ML
50 INJECTION INTRAVENOUS ONCE
Refills: 0 | Status: DISCONTINUED | OUTPATIENT
Start: 2022-04-13 | End: 2022-04-13

## 2022-04-13 RX ORDER — PROPOFOL 10 MG/ML
20 INJECTION, EMULSION INTRAVENOUS ONCE
Refills: 0 | Status: COMPLETED | OUTPATIENT
Start: 2022-04-13 | End: 2022-04-13

## 2022-04-13 RX ORDER — OXYCODONE HYDROCHLORIDE 5 MG/1
1 TABLET ORAL
Qty: 16 | Refills: 0
Start: 2022-04-13 | End: 2022-04-16

## 2022-04-13 RX ORDER — ONDANSETRON 8 MG/1
4 TABLET, FILM COATED ORAL ONCE
Refills: 0 | Status: COMPLETED | OUTPATIENT
Start: 2022-04-13 | End: 2022-04-13

## 2022-04-13 RX ORDER — PROPOFOL 10 MG/ML
70 INJECTION, EMULSION INTRAVENOUS ONCE
Refills: 0 | Status: COMPLETED | OUTPATIENT
Start: 2022-04-13 | End: 2022-04-13

## 2022-04-13 RX ORDER — KETAMINE HYDROCHLORIDE 100 MG/ML
100 INJECTION INTRAMUSCULAR; INTRAVENOUS ONCE
Refills: 0 | Status: DISCONTINUED | OUTPATIENT
Start: 2022-04-13 | End: 2022-04-13

## 2022-04-13 RX ORDER — PROPOFOL 10 MG/ML
60 INJECTION, EMULSION INTRAVENOUS ONCE
Refills: 0 | Status: COMPLETED | OUTPATIENT
Start: 2022-04-13 | End: 2022-04-13

## 2022-04-13 RX ORDER — KETAMINE HYDROCHLORIDE 100 MG/ML
60 INJECTION INTRAMUSCULAR; INTRAVENOUS ONCE
Refills: 0 | Status: DISCONTINUED | OUTPATIENT
Start: 2022-04-13 | End: 2022-04-13

## 2022-04-13 RX ADMIN — PROPOFOL 20 MILLIGRAM(S): 10 INJECTION, EMULSION INTRAVENOUS at 10:53

## 2022-04-13 RX ADMIN — MORPHINE SULFATE 4 MILLIGRAM(S): 50 CAPSULE, EXTENDED RELEASE ORAL at 12:04

## 2022-04-13 RX ADMIN — PROPOFOL 40 MILLIGRAM(S): 10 INJECTION, EMULSION INTRAVENOUS at 10:40

## 2022-04-13 RX ADMIN — KETAMINE HYDROCHLORIDE 100 MILLIGRAM(S): 100 INJECTION INTRAMUSCULAR; INTRAVENOUS at 11:08

## 2022-04-13 RX ADMIN — MORPHINE SULFATE 4 MILLIGRAM(S): 50 CAPSULE, EXTENDED RELEASE ORAL at 13:17

## 2022-04-13 RX ADMIN — PROPOFOL 40 MILLIGRAM(S): 10 INJECTION, EMULSION INTRAVENOUS at 10:50

## 2022-04-13 RX ADMIN — FENTANYL CITRATE 50 MICROGRAM(S): 50 INJECTION INTRAVENOUS at 08:59

## 2022-04-13 RX ADMIN — PROPOFOL 60 MILLIGRAM(S): 10 INJECTION, EMULSION INTRAVENOUS at 10:38

## 2022-04-13 RX ADMIN — FENTANYL CITRATE 50 MICROGRAM(S): 50 INJECTION INTRAVENOUS at 09:43

## 2022-04-13 RX ADMIN — KETAMINE HYDROCHLORIDE 60 MILLIGRAM(S): 100 INJECTION INTRAMUSCULAR; INTRAVENOUS at 10:45

## 2022-04-13 RX ADMIN — ONDANSETRON 4 MILLIGRAM(S): 8 TABLET, FILM COATED ORAL at 11:14

## 2022-04-13 RX ADMIN — PROPOFOL 70 MILLIGRAM(S): 10 INJECTION, EMULSION INTRAVENOUS at 11:08

## 2022-04-13 NOTE — ED ADULT NURSE NOTE - OBJECTIVE STATEMENT
Pt presents to the ER with c/o left hip injury. Pt received a hip replacement at the end of February. Pt stated that this morning she woke up this morning and stretched and felt that her hip popped out of place and went back in Pt presents to the ER with c/o left hip injury. Pt received a hip replacement at the end of February. Pt stated that this morning she woke up this morning and stretched and felt that her hip popped out of place and went back in. Pt then stood up and took a few steps, she stated that she felt the joint popped out and fell to the floor. Pt is c/o of extreme pain hip is externally rotated. Pt is a +2 pulse in her dorsalis pedis and is able to move her toes without any complications. Pt denies any falls or injury to the hip.

## 2022-04-13 NOTE — ED ADULT TRIAGE NOTE - CHIEF COMPLAINT QUOTE
pt presents to ED due to left hip dislocation pt states she was getting out of bed when it popped out of place severe pain

## 2022-04-13 NOTE — ED PROVIDER NOTE - NSFOLLOWUPINSTRUCTIONS_ED_ALL_ED_FT
Closed Reduction for Prosthetic Hip Joint Dislocation, Care After      This sheet gives you information about how to care for yourself after your procedure. Your health care provider may also give you more specific instructions. If you have problems or questions, contact your health care provider.      What can I expect after the procedure?    After the procedure, it is common to have:  •Hip pain for about a week. The pain should lessen each day. It may take a few weeks to recover completely.      •Trouble walking or doing your usual daily activities. You may need to use crutches for a period of time.        Follow these instructions at home:    Medicines     •Take over-the-counter and prescription medicines only as told by your health care provider.    •Ask your health care provider if the medicine prescribed to you:  •Requires you to avoid driving or using machinery.    •Can cause constipation. You may need to take these actions to prevent or treat constipation:  •Drink enough fluid to keep your urine pale yellow.      •Take over-the-counter or prescription medicines.      •Eat foods that are high in fiber, such as beans, whole grains, and fresh fruits and vegetables.      •Limit foods that are high in fat and processed sugars, such as fried or sweet foods.          If you have a brace:     •Wear the brace as told by your health care provider. Remove it only as told by your health care provider.      •Loosen the brace if your toes tingle, become numb, or turn cold and blue.      •Keep the brace clean and dry.      Bathing     If the brace is not waterproof:  •Do not let it get wet.      •Cover it with a watertight covering when you take a bath or shower.        Managing pain, stiffness, and swelling  •If directed, put ice on the affected area. To do this:  •If you have a removable brace, remove it as told by your health care provider.      •Put ice in a plastic bag.      •Place a towel between your skin and the bag or between your brace and the bag.      •Leave the ice on for 20 minutes, 2–3 times a day.      •Remove the ice if your skin turns bright red. This is very important. If you cannot feel pain, heat, or cold, you have a greater risk of damage to the area.        •Move your toes often to reduce stiffness and swelling.      •Raise (elevate) your leg above the level of your heart while you are lying down.      Activity    •Ask your health care provider what activities are safe for you. Stop any activity that causes pain or discomfort. Ask for help with activities that are difficult.      •Do not use your injured leg to support (bear) your body weight until your health care provider says that you can. Follow instructions about how much weight you may safely support on your affected leg (weight-bearing restrictions).      •Use crutches as told by your health care provider. When you stop using your crutches, walk and move slowly until you feel stable.      •Do not lift anything that is heavier than 10 lb (4.5 kg), or the limit that you are told, until your health care provider says that it is safe.      •Do exercises as told by your health care provider.        Movement restrictions  •Follow all hip dislocation precautions in any position (standing, sitting, or lying) as told by your health care provider. These precautions may include the following:  •Do not cross your legs at the knees. To remind yourself about this, you may keep a pillow between your legs while lying in bed.    •Do not bend forward at the waist (to avoid hip flexion of more then 90 degrees). To avoid bending this far:  •Do not bring your knees higher than the level of your hips.      •Do not  something from the floor while sitting in a chair.      •Avoid sitting in low chairs.      •Use a raised toilet seat      •When standing up from a seated position, keep the injured leg out in front of you.      •Do not bend down to a squat position while in the shower. You may need someone to help you while you shower.        •Avoid twisting at your waist and reaching across your body to the side of the affected leg.      •Avoid rotating the toes of your affected leg inward.      •When getting into a car:  1.Raise the seat as high as possible, move the seat as far back as it will go, and recline the upper part of the seat slightly.      2.Sit down into the seat with your injured leg extended out of the car.      3.Scoot back in the seat as you move the lower half of your body into the car. Try to avoid bumping your foot or leg as you bring it into the car.        General instructions    •Ask your health care provider when it is safe for you to drive.      •Do not use any products that contain nicotine or tobacco, such as cigarettes, e-cigarettes, and chewing tobacco. These can delay healing. If you need help quitting, ask your health care provider.      •Keep all follow-up visits. This is important.        Contact a health care provider if:    •Walking or moving is not getting easier for you.      •Your calf swells or feels tender.      •You have swelling that gets worse or is severe.      •Any part of your hip or leg feels numb, tingles, burns, or stings.      •You have pain that does not get better with medicine.      •Your brace is damaged or has gotten wet and is not waterproof.        Get help right away if:    •You think you have dislocated your hip again.      •You cannot move your leg.      •You have trouble breathing.      •You have chest pain.      These symptoms may represent a serious problem that is an emergency. Do not wait to see if the symptoms will go away. Get medical help right away. Call your local emergency services (911 in the [place] Do not drive yourself to the hospital.       Summary    • Do not use your injured leg to support your body weight until your health care provider says that you can. Follow instructions about how much weight you may safely support on your affected leg (weight-bearing restrictions).      •Use crutches as told by your health care provider. When you stop using your crutches, move slowly at first.      •Keep all follow-up visits. This is important.      This information is not intended to replace advice given to you by your health care provider. Make sure you discuss any questions you have with your health care provider.      Document Revised: 05/14/2021 Document Reviewed: 05/14/2021    Elsevier Patient Education © 2022 [employer]

## 2022-04-13 NOTE — ED ADULT NURSE NOTE - NSFALLRSKASSESASSIST_ED_ALL_ED
Pt called stating that she received a call from Dr Courtney García office, don't believe I see anything in TE. Please advise.
yes

## 2022-04-13 NOTE — ED PROVIDER NOTE - PHYSICAL EXAMINATION
GEN - NAD; well appearing; A+O x3   HEAD - NC/AT     EYES - EOMI, no conjunctival pallor, no scleral icterus  ENT -   mucous membranes  moist , no discharge    Mallampati 1   NECK - Neck supple  PULM - CTA b/l,  symmetric breath sounds  COR -  RRR, S1 S2, no murmurs  ABD - , ND, NT, soft, no guarding, no rebound, no masses    BACK - no CVA tenderness, nontender spine     EXTREMS - left hip ttp, internally rotated, + DP pulse   SKIN - no rash or bruising      NEUROLOGIC - alert, sensation nl, motor 5/5 RUE/LUE/RLE/LLE

## 2022-04-13 NOTE — ED ADULT NURSE NOTE - NSIMPLEMENTINTERV_GEN_ALL_ED
Implemented All Fall with Harm Risk Interventions:  [place] to call system. Call bell, personal items and telephone within reach. Instruct patient to call for assistance. Room bathroom lighting operational. Non-slip footwear when patient is off stretcher. Physically safe environment: no spills, clutter or unnecessary equipment. Stretcher in lowest position, wheels locked, appropriate side rails in place. Provide visual cue, wrist band, yellow gown, etc. Monitor gait and stability. Monitor for mental status changes and reorient to person, place, and time. Review medications for side effects contributing to fall risk. Reinforce activity limits and safety measures with patient and family. Provide visual clues: red socks.

## 2022-04-13 NOTE — CONSULT NOTE ADULT - ASSESSMENT
A/P: 56y Female with Left hip prosthesis dislocation s/p Total hip Arthroplasty    Procedure: Closed Reduction under conscious sedation in ED. Hip felt to relocate smooth PROM and corrected limb length clinically. post reduction +EHL FHL TA GS ans SILT all toes    -Post reduction films show hip is located  -Post reduction exam unchanged, +EHL FHL TA GS  -placed and KI  -WBAT, posterior and anterior hip precautions  -Follow up with orthopedist in 1-2 weeks  -Discussed with Attending Dr. Rowland and relayed plan to ED Physician Dr. Medina  -Ortho Stable for DC when cleared post sedation by ED  -thank you for great teamwork participation in taking care of this patient!

## 2022-04-13 NOTE — ED PROVIDER NOTE - OBJECTIVE STATEMENT
57yo f pmh recent left hip replacement 2/22 now w/ left hip pain after trying to stand this morning when getting out of bed. pt felt a pop and immediately knew hip was dislocated. + severe left hip pain. happened just prior to arrival.  Ortho: [facility].    last ate and drank last night.

## 2022-04-13 NOTE — ED PROVIDER NOTE - PATIENT PORTAL LINK FT
You can access the FollowMyHealth Patient Portal offered by [facility] by registering at the following website: [email or web address]. By joining [employer]’s FollowMyHealth portal, you will also be able to view your health information using other applications (apps) compatible with our system.

## 2022-04-13 NOTE — PROCEDURE NOTE - ADDITIONAL PROCEDURE DETAILS
we spoke w DR Rowland about the above patient in detail. recommends KI, post/ant hip precautions and FU in office

## 2022-04-13 NOTE — CONSULT NOTE ADULT - SUBJECTIVE AND OBJECTIVE BOX
56F hx L RACHEL with Dr. Rowland in 02/2022 presents to  ED today with severe pain and inability to range the left hip. Patient notes that she was stretching in bed when she suddenly felt her left hip 'pop out;' it spontaneously reduced, but redislocated shortly after. Patient noted severe pain over the left hip and groin with palpable deformity at the anterior aspect of the hip. She was brought to the ED for further evaluation and management. In the ED, patient endorses pain over the anterior groin and pain with motion at the hip. Patient is unable to move the hip. Denies falls or trauma. Patient denies any numbness, tingling, weakness, or any other orthopaedic complaint.     Exam:   T(C): 37.2 (04-13-22 @ 08:39), Max: 37.2 (04-13-22 @ 08:39)  T(F): 99 (04-13-22 @ 08:39), Max: 99 (04-13-22 @ 08:39)  HR: 85 (04-13-22 @ 08:39) (85 - 85)  BP: 116/92 (04-13-22 @ 08:39) (116/92 - 116/92)  RR: 16 (04-13-22 @ 08:39) (16 - 16)  SpO2: 100% (04-13-22 @ 08:39) (100% - 100%)    Gen: resting in bed  LLE:   extremity is shortened and externally rotated.   TTP over the anterior groin and with attempted ROM of the left hip. Palpable prosthesis over anterior groin.   SILT L2-S1  GSC/TA/EHL/FHL intact  DP pulse palpable.  No calf tenderness bilaterally.  Compartments soft and compressible.     Laboratory Results:   CBC, 04-13-22 @ 08:45    WBC: 4.13  Hgb: 11.7  Hct: 37.1  Plt: 266      Chemistry, 04-13-22 @ 08:45    Na: 142     AST: 19  K: 3.7       ALT: 18  Cl: 110      TProt: 6.5  CO2: 26     Alb: 3.9  BUN: 20     TBili: 0.6  Cr: 0.64      AP: 58  Glu: 109       Mg: --  P: --    eGFR: --  eGFR, AA: --        Imaging: XR of the left hip demonstrating anterior dislocation of total hip arthroplasty.

## 2022-04-13 NOTE — ED CLERICAL - NS ED CLERK NOTE PRE-ARRIVAL INFORMATION; ADDITIONAL PRE-ARRIVAL INFORMATION
This patient is enrolled in the comprehensive joint replacement (CJR) program and has active care navigation.  This patient can be followed up by the care navigation team within 24 hours. To arrange close follow-up or to obtain additional clinical information about this patient, please call the contact number above. Please call the hospitalist for medical consultation ([phone]) PRIOR to admission or observation.  The hospitalist is part of the care team and can assist in acute medical management. Please call the orthopedic team () for ALL patients who require admission.

## 2022-04-14 ENCOUNTER — APPOINTMENT (OUTPATIENT)
Dept: ORTHOPEDIC SURGERY | Facility: CLINIC | Age: 57
End: 2022-04-14
Payer: COMMERCIAL

## 2022-04-14 DIAGNOSIS — S73.005A UNSPECIFIED DISLOCATION OF LEFT HIP, INITIAL ENCOUNTER: ICD-10-CM

## 2022-04-14 PROCEDURE — 73502 X-RAY EXAM HIP UNI 2-3 VIEWS: CPT | Mod: LT

## 2022-04-14 PROCEDURE — 99024 POSTOP FOLLOW-UP VISIT: CPT

## 2022-04-19 ENCOUNTER — APPOINTMENT (OUTPATIENT)
Dept: ORTHOPEDIC SURGERY | Facility: CLINIC | Age: 57
End: 2022-04-19
Payer: COMMERCIAL

## 2022-04-19 PROCEDURE — 99024 POSTOP FOLLOW-UP VISIT: CPT

## 2022-04-19 PROCEDURE — 73502 X-RAY EXAM HIP UNI 2-3 VIEWS: CPT | Mod: LT

## 2022-04-20 NOTE — ADDENDUM
[FreeTextEntry1] : This note was written by America Torres on 04/20/2022 acting as a scribe for THIEN MACHUCA III, MD

## 2022-04-20 NOTE — HISTORY OF PRESENT ILLNESS
[de-identified] : Post op Left Hip [de-identified] : The patient comes in today for her left hip.  She had an episode where she extended in bed, rotated and felt the hip pop out on her.  She went to the hospital and had to have it reduced. She had an anterior dislocation. [de-identified] : Right Hip: \par Range of Motion in Degrees:\par 	                                 Claimant:	   Normal:	\par Flexion (Active) 	                 120 	   120-degrees	\par Flexion (Passive)	                 120	   120-degrees	\par Extension (Active)	                 -30	   -30-degrees	\par Extension (Passive)	 -30	   -30-degrees	\par Abduction (Active)	                 45-50	   87-85-abtmgch	\par Abduction (Passive)	 45-50	   22-90-bcucfbp	\par Adduction (Active)  	 20-30	   29-80-ldesnjw	\par Adduction (Passive)	 20-30	   91-98-sxfaqxd	\par Internal Rotation (Active) 	 35	   35-degrees	\par Internal Rotation (Passive)	 35	   35-degrees	\par External Rotation (Active)	 45	   45-degrees	\par External Rotation (Passive)	 45	   45-degrees	\par \par The patient was  apprehensive with any extension. No tenderness with internal or external rotation or axial load.  No tenderness to palpation over the greater trochanter.  Negative Trendelenburg.  No tenderness with resisted abduction.  No weakness to flexion, extension, abduction or adduction.  No evidence of instability.  No motor or sensory deficits.  2+ DP and PT pulses.  Skin is intact.  No scars, rashes or lesions.    [de-identified] : Radiographs, two-three views of the left hip, including the pelvis, show the hip to be reduced. [de-identified] : Anterior instability  of the left hip\par Status post left hip replacement [de-identified] : At this time, due to anterior instability of the left hip, we had a long discussion and I recommend she continue immobilization, weightbearing as tolerated, avoiding extension and external rotation.  I advised her that she may require revision surgery.

## 2022-05-16 ENCOUNTER — APPOINTMENT (OUTPATIENT)
Dept: ORTHOPEDIC SURGERY | Facility: CLINIC | Age: 57
End: 2022-05-16

## 2022-05-16 PROCEDURE — 73502 X-RAY EXAM HIP UNI 2-3 VIEWS: CPT | Mod: LT

## 2022-05-16 PROCEDURE — 99024 POSTOP FOLLOW-UP VISIT: CPT

## 2022-05-18 NOTE — ADDENDUM
[FreeTextEntry1] : This note was written by America Torres on 05/18/2022 acting as a scribe for THIEN MACHUCA III, MD

## 2022-05-18 NOTE — HISTORY OF PRESENT ILLNESS
[de-identified] : Status post left total hip arthroplasty [de-identified] : She comes in today for her left hip.  She states she is feeling a little bit better.  She has not had any episodes.   [de-identified] : Left Hip: \par Range of Motion in Degrees:\par 	                                 Claimant:	   Normal:	\par Flexion (Active) 	                 120 	   120-degrees	\par Flexion (Passive)	                 120	   120-degrees	\par Extension (Active)	                 -30	   -30-degrees	\par Extension (Passive)	 -30	   -30-degrees	\par Abduction (Active)	                 45-50	   82-86-atldoed	\par Abduction (Passive)	 45-50	   17-85-ldcwbgb	\par Adduction (Active)  	 20-30	   94-81-qmpnzsd	\par Adduction (Passive)	 20-30	   80-21-kwvwwdb	\par Internal Rotation (Active) 	 35	   35-degrees	\par Internal Rotation (Passive)	 35	   35-degrees	\par External Rotation (Active)	 45	   45-degrees	\par External Rotation (Passive)	 45	   45-degrees	\par \par No tenderness with internal or external rotation or axial load.  No tenderness to palpation over the greater trochanter.  Negative Trendelenburg.  No tenderness with resisted abduction.  No weakness to flexion, extension, abduction or adduction.  No evidence of instability.  No motor or sensory deficits.  2+ DP and PT pulses.  Well-healed scar.\par   [de-identified] : Radiographs taken in the office today, two to three views of the left hip, including pelvis, show good position of the implant. [de-identified] : Status post left total hip arthroplasty. [de-identified] : She has been wearing the knee immobilizer.  We are going to wean her off it.  She is going to be reassessed in four to six weeks.  We will discuss the potential for further intervention if she shows any evidence of instability.

## 2022-06-09 ENCOUNTER — APPOINTMENT (OUTPATIENT)
Dept: ORTHOPEDIC SURGERY | Facility: CLINIC | Age: 57
End: 2022-06-09

## 2022-06-09 PROCEDURE — 99213 OFFICE O/P EST LOW 20 MIN: CPT

## 2022-06-13 NOTE — PHYSICAL EXAM
[de-identified] : Left Hip: \par Range of Motion in Degrees:\par 	                                 Claimant:	   Normal:	\par Flexion (Active) 	                 120 	   120-degrees	\par Flexion (Passive)	                 120	   120-degrees	\par Extension (Active)	                 -30	   -30-degrees	\par Extension (Passive)	 -30	   -30-degrees	\par Abduction (Active)	                 45-50	   98-77-sjqbgim	\par Abduction (Passive)	 45-50	   08-55-rqzszaj	\par Adduction (Active)  	 20-30	   81-23-dtsdmsa	\par Adduction (Passive)	 20-30	   79-87-bwydknp	\par Internal Rotation (Active) 	 35	   35-degrees	\par Internal Rotation (Passive)	 35	   35-degrees	\par External Rotation (Active)	 45	   45-degrees	\par External Rotation (Passive)	 45	   45-degrees	\par \par No tenderness with internal or external rotation or axial load.  No tenderness to palpation over the greater trochanter.  Negative Trendelenburg.  No tenderness with resisted abduction.  No weakness to flexion, extension, abduction or adduction.  No evidence of instability.  No motor or sensory deficits.  2+ DP and PT pulses.  Skin is intact.  No scars, rashes or lesions.  \par   [de-identified] : Ambulating with a slightly antalgic to antalgic gait.  Station:  Normal.  [de-identified] : Appearance:  Well-developed, well-nourished female in no acute distress.\par

## 2022-06-13 NOTE — ADDENDUM
[FreeTextEntry1] : This note was written by America Torres on 06/13/2022 acting as a scribe for HTIEN MACHUCA III, MD

## 2022-06-13 NOTE — DISCUSSION/SUMMARY
[de-identified] : At this time, due to osteoarthritis of the left hip, we had a long discussion and we discussed extension and internal rotation precautions and weaning off her brace. She will be reassessed in six weeks.

## 2022-06-13 NOTE — HISTORY OF PRESENT ILLNESS
[de-identified] : The patient comes in today for her left hip.  She states she is feeling fine.  She occasionally has a little pull into her buttocks, but overall, she feels much better.

## 2022-06-20 ENCOUNTER — RESULT REVIEW (OUTPATIENT)
Age: 57
End: 2022-06-20

## 2022-06-20 ENCOUNTER — APPOINTMENT (OUTPATIENT)
Dept: ULTRASOUND IMAGING | Facility: CLINIC | Age: 57
End: 2022-06-20
Payer: COMMERCIAL

## 2022-06-20 ENCOUNTER — OUTPATIENT (OUTPATIENT)
Dept: OUTPATIENT SERVICES | Facility: HOSPITAL | Age: 57
LOS: 1 days | End: 2022-06-20
Payer: COMMERCIAL

## 2022-06-20 DIAGNOSIS — Z98.890 OTHER SPECIFIED POSTPROCEDURAL STATES: Chronic | ICD-10-CM

## 2022-06-20 DIAGNOSIS — Z96.641 PRESENCE OF RIGHT ARTIFICIAL HIP JOINT: Chronic | ICD-10-CM

## 2022-06-20 DIAGNOSIS — N60.19 DIFFUSE CYSTIC MASTOPATHY OF UNSPECIFIED BREAST: ICD-10-CM

## 2022-06-20 PROCEDURE — 76642 ULTRASOUND BREAST LIMITED: CPT | Mod: 26,RT

## 2022-06-20 PROCEDURE — 76642 ULTRASOUND BREAST LIMITED: CPT

## 2022-06-21 ENCOUNTER — APPOINTMENT (OUTPATIENT)
Dept: ORTHOPEDIC SURGERY | Facility: CLINIC | Age: 57
End: 2022-06-21
Payer: COMMERCIAL

## 2022-06-21 DIAGNOSIS — M19.039 PRIMARY OSTEOARTHRITIS, UNSPECIFIED WRIST: ICD-10-CM

## 2022-06-21 PROCEDURE — 99212 OFFICE O/P EST SF 10 MIN: CPT | Mod: 25

## 2022-06-21 PROCEDURE — 20600 DRAIN/INJ JOINT/BURSA W/O US: CPT | Mod: 50

## 2022-06-21 NOTE — ADDENDUM
[FreeTextEntry1] : IEdwige assisted with documentation on 06/21/2022 acting as scribe for Dr. Krystyna Khalil.

## 2022-06-21 NOTE — REVIEW OF SYSTEMS
[Joint Pain] : joint pain [Joint Swelling] : joint swelling [Negative] : Allergic/Immunologic [FreeTextEntry9] : Bilateral thumbs

## 2022-06-21 NOTE — ASSESSMENT
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  \par \par Nature and history of the condition was discussed at length. Risks and benefits of conservative observation and treatment versus repeat injections were discussed in detail. She received bilateral steroid injections in office today, patient tolerated procedure well with no complications. Recommended OTC Voltaren gel and wrist bracing as needed for pain. Comfort cool braces provided today. If symptoms persist, she will follow up as needed for surgical intervention planning. \par \par All questions and concerns have been addressed, and the patient is in agreement with the above plan.

## 2022-06-21 NOTE — HISTORY OF PRESENT ILLNESS
[FreeTextEntry1] :  55-year-old female presents for evaluation of pain at the base of bilateral thumbs right worse than left. Her symptoms have been present for approximately 2 years she denies trauma or inciting event. She has no aching pain at the base of the thumb with a circumferential radiation. 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 She notes her symptoms are worse on the right thumb than the left. She has recently received a steroid injection to her left thumb with Dr. R[place], and notes some improvement following. She reports worsening of her right thumb pain, which is limiting her activities during the day. She reports pain at rest as well. She would like to consider a cortisone injection to the right thumb today. 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 She reports her pain has been worsening over the last couple of months, with now her left thumb more painful than the right. She reports she has had good benefit from the injections in the past, and would like to proceed with a bilateral STT joint steroid injection today. \par \par 01/28/2022: Patient returns for reevaluation of bilateral thumb/wrist pain 2/2 STT arthritis. She reports return of her pain following a bilateral steroid injection for about 1-2 weeks. She notes her symptoms are severe in nature. She would like to consider a repeat steroid injection as she notes she responds well. \par \par 06/21/2022: Patient returns for reevaluation of bilateral thumb and wrist pain secondary to STT arthritis. She complains of persistent bilateral thumb pain and swelling. She is interested in bilateral cortisone injections today.

## 2022-06-21 NOTE — PHYSICAL EXAM
[Normal] : Oriented to person, place, and time, insight and judgement were intact and the affect was normal [de-identified] : There is tenderness over the bilateral STT joint with a positive grind test and shoulder deformity. There is a positive crank test. There is swelling appreciated over the affected STT joint bilaterally. There is no evidence of infection or lymphadenopathy bilaterally to the level of the elbows There is no evidence of MCP hyperextension bilaterally. There is a negative Finkelstein's test There is no tenderness over the A-1 pulleys bilaterally. 5/5 strength bilaterally. \par \par The wrists have a symmetric and full range of motion bilaterally with no pain upon forced flexion, extension, pronation and supination. There is no tenderness over the scaphoid scapholunate region ligaments and no tenderness of the TFCC bilaterally. There is no tenderness of the pisotriquetral hamate hook. The second through fifth CMC his is stable and nontender bilaterally.\par There is a negative carpal tunnel compression test or Tinel's bilaterally.  [de-identified] : No imaging done today.

## 2022-06-21 NOTE — PROCEDURE
[FreeTextEntry1] : My impression is that the patient has STT joint osteoarthritis. We discussed treatment options and she elected to undergo a cortisone injection for bilateral thumb base. The risks, benefits, and alternatives were discussed with the patient. This included, but was not limited to nerve injury, infection, subcutaneous atrophy, skin depigmentation etc. Under informed consent and sterile conditions, the right base of the thumbs were anesthetized with 1% lidocaine. The bilateral STT joints were then injected with 1cc of 40mg depo. It is my hopes that this significantly alleviates the patients symptoms.

## 2022-07-10 ENCOUNTER — RX RENEWAL (OUTPATIENT)
Age: 57
End: 2022-07-10

## 2022-07-21 ENCOUNTER — APPOINTMENT (OUTPATIENT)
Dept: ORTHOPEDIC SURGERY | Facility: CLINIC | Age: 57
End: 2022-07-21

## 2022-07-21 DIAGNOSIS — M16.12 UNILATERAL PRIMARY OSTEOARTHRITIS, LEFT HIP: ICD-10-CM

## 2022-07-21 PROCEDURE — 99213 OFFICE O/P EST LOW 20 MIN: CPT

## 2022-07-25 NOTE — PHYSICAL EXAM
[de-identified] : Left Hip: \par Range of Motion in Degrees:\par 	                                 Claimant:	   Normal:	\par Flexion (Active) 	                 120 	   120-degrees	\par Flexion (Passive)	                 120	   120-degrees	\par Extension (Active)	                 -30	   -30-degrees	\par Extension (Passive)	 -30	   -30-degrees	\par Abduction (Active)	                 45-50	   74-27-dozqrdo	\par Abduction (Passive)	 45-50	   00-15-mxsntmf	\par Adduction (Active)  	 20-30	   19-17-fyyjimt	\par Adduction (Passive)	 20-30	   08-97-ohohtdm	\par Internal Rotation (Active) 	 35	   35-degrees	\par Internal Rotation (Passive)	 35	   35-degrees	\par External Rotation (Active)	 45	   45-degrees	\par External Rotation (Passive)	 45	   45-degrees	\par \par No tenderness with internal or external rotation or axial load.  No tenderness to palpation over the greater trochanter.  Negative Trendelenburg.  No tenderness with resisted abduction.  No weakness to flexion, extension, abduction or adduction.  No evidence of instability.  No motor or sensory deficits.  2+ DP and PT pulses.  Skin is intact.  No scars, rashes or lesions.  \par   [de-identified] : Ambulating with a normal gait. Station:  Normal.  [de-identified] : Appearance:  Well-developed, well-nourished female in no acute distress.\par

## 2022-07-25 NOTE — ADDENDUM
[FreeTextEntry1] : This note was written by America Torres on 07/25/2022 acting as a scribe for THIEN MACHUCA III, MD

## 2022-07-25 NOTE — HISTORY OF PRESENT ILLNESS
[de-identified] : The patient comes in today for her left hip.  She states she is much improved.

## 2022-07-25 NOTE — DISCUSSION/SUMMARY
[de-identified] : At this time, due to osteoarthritis of the left hip, she will continue her hip precautions. She will follow up with me on an as needed basis.

## 2022-09-12 ENCOUNTER — APPOINTMENT (OUTPATIENT)
Dept: OBGYN | Facility: CLINIC | Age: 57
End: 2022-09-12

## 2022-09-12 VITALS
SYSTOLIC BLOOD PRESSURE: 102 MMHG | DIASTOLIC BLOOD PRESSURE: 60 MMHG | BODY MASS INDEX: 27.48 KG/M2 | HEIGHT: 60 IN | WEIGHT: 140 LBS

## 2022-09-12 DIAGNOSIS — Z01.419 ENCOUNTER FOR GYNECOLOGICAL EXAMINATION (GENERAL) (ROUTINE) W/OUT ABNORMAL FINDINGS: ICD-10-CM

## 2022-09-12 DIAGNOSIS — N95.2 POSTMENOPAUSAL ATROPHIC VAGINITIS: ICD-10-CM

## 2022-09-12 DIAGNOSIS — N95.1 MENOPAUSAL AND FEMALE CLIMACTERIC STATES: ICD-10-CM

## 2022-09-12 PROCEDURE — 99396 PREV VISIT EST AGE 40-64: CPT

## 2022-09-12 PROCEDURE — 82270 OCCULT BLOOD FECES: CPT

## 2022-09-13 LAB — HPV HIGH+LOW RISK DNA PNL CVX: NOT DETECTED

## 2022-09-15 LAB — CYTOLOGY CVX/VAG DOC THIN PREP: NORMAL

## 2022-09-18 PROBLEM — Z01.419 WOMEN'S ANNUAL ROUTINE GYNECOLOGICAL EXAMINATION: Status: ACTIVE | Noted: 2022-09-18

## 2022-09-18 PROBLEM — N95.2 VAGINAL ATROPHY: Status: ACTIVE | Noted: 2022-09-18

## 2022-09-18 PROBLEM — Z01.419 WOMEN'S ANNUAL ROUTINE GYNECOLOGICAL EXAMINATION: Status: RESOLVED | Noted: 2019-11-11 | Resolved: 2022-09-18

## 2022-09-18 NOTE — HISTORY OF PRESENT ILLNESS
[FreeTextEntry1] : Patient is a 57-year-old female who presents for a routine annual gynecologic examination.  Patient with no complaints.  Patient's last mammogram was September 2021 last bone density was September '21 as well.

## 2022-09-28 LAB
ALBUMIN SERPL ELPH-MCNC: 4.6 G/DL
ALP BLD-CCNC: 60 U/L
ALT SERPL-CCNC: 11 U/L
AST SERPL-CCNC: 16 U/L
BILIRUB DIRECT SERPL-MCNC: 0.1 MG/DL
BILIRUB INDIRECT SERPL-MCNC: 0.3 MG/DL
BILIRUB SERPL-MCNC: 0.4 MG/DL
ESTRADIOL SERPL-MCNC: 53 PG/ML
FSH SERPL-MCNC: 41.2 IU/L
PROT SERPL-MCNC: 6.5 G/DL
TSH SERPL-ACNC: 0.8 UIU/ML

## 2022-09-29 ENCOUNTER — NON-APPOINTMENT (OUTPATIENT)
Age: 57
End: 2022-09-29

## 2022-09-29 ENCOUNTER — APPOINTMENT (OUTPATIENT)
Dept: OBGYN | Facility: CLINIC | Age: 57
End: 2022-09-29

## 2022-09-29 DIAGNOSIS — R68.82 DECREASED LIBIDO: ICD-10-CM

## 2022-09-29 DIAGNOSIS — N95.1 MENOPAUSAL AND FEMALE CLIMACTERIC STATES: ICD-10-CM

## 2022-09-29 PROCEDURE — 99214 OFFICE O/P EST MOD 30 MIN: CPT

## 2022-09-29 NOTE — HISTORY OF PRESENT ILLNESS
[FreeTextEntry1] : Patient is a 57-year-old female who presents for consultation to discuss her recent blood test results and treatment options.  Patient has menopausal syndrome is currently on oral HRT but is not satisfied with the results.  Discussed the results with patient in detail and in view of patient's additional complaint of decreased libido advised patient to go on bioidentical HRT including estrogen progesterone and testosterone.  Risk benefits and alternatives reviewed and questions answered.  Patient is amenable we will prescribe estrogen 1 mg/progesterone 25 mg/testosterone 2 mg/cream daily instructions and precautions reviewed follow-up in 3 months with additional lab work\par \par 30 minutes of face time

## 2022-10-03 ENCOUNTER — RX RENEWAL (OUTPATIENT)
Age: 57
End: 2022-10-03

## 2022-10-03 LAB
TESTOST FREE SERPL-MCNC: 1.9 PG/ML
TESTOST SERPL-MCNC: 9.4 NG/DL

## 2022-10-04 LAB — DHEA-SULFATE, SERUM: 69 UG/DL

## 2022-12-10 LAB
ALBUMIN SERPL ELPH-MCNC: 4.5 G/DL
ALP BLD-CCNC: 73 U/L
ALT SERPL-CCNC: 12 U/L
AST SERPL-CCNC: 19 U/L
BILIRUB DIRECT SERPL-MCNC: 0.1 MG/DL
BILIRUB INDIRECT SERPL-MCNC: 0.4 MG/DL
BILIRUB SERPL-MCNC: 0.4 MG/DL
ESTRADIOL SERPL-MCNC: 9 PG/ML
PROT SERPL-MCNC: 6.6 G/DL

## 2022-12-12 ENCOUNTER — OUTPATIENT (OUTPATIENT)
Dept: OUTPATIENT SERVICES | Facility: HOSPITAL | Age: 57
LOS: 1 days | End: 2022-12-12
Payer: COMMERCIAL

## 2022-12-12 ENCOUNTER — APPOINTMENT (OUTPATIENT)
Dept: MAMMOGRAPHY | Facility: CLINIC | Age: 57
End: 2022-12-12

## 2022-12-12 ENCOUNTER — RESULT REVIEW (OUTPATIENT)
Age: 57
End: 2022-12-12

## 2022-12-12 ENCOUNTER — APPOINTMENT (OUTPATIENT)
Dept: ULTRASOUND IMAGING | Facility: CLINIC | Age: 57
End: 2022-12-12

## 2022-12-12 DIAGNOSIS — Z98.890 OTHER SPECIFIED POSTPROCEDURAL STATES: Chronic | ICD-10-CM

## 2022-12-12 DIAGNOSIS — Z96.641 PRESENCE OF RIGHT ARTIFICIAL HIP JOINT: Chronic | ICD-10-CM

## 2022-12-12 DIAGNOSIS — N95.2 POSTMENOPAUSAL ATROPHIC VAGINITIS: ICD-10-CM

## 2022-12-12 PROCEDURE — 76641 ULTRASOUND BREAST COMPLETE: CPT | Mod: 26,50

## 2022-12-12 PROCEDURE — 77067 SCR MAMMO BI INCL CAD: CPT | Mod: 26

## 2022-12-12 PROCEDURE — 77063 BREAST TOMOSYNTHESIS BI: CPT | Mod: 26

## 2022-12-12 PROCEDURE — 77067 SCR MAMMO BI INCL CAD: CPT

## 2022-12-12 PROCEDURE — 77063 BREAST TOMOSYNTHESIS BI: CPT

## 2022-12-12 PROCEDURE — 76641 ULTRASOUND BREAST COMPLETE: CPT

## 2022-12-14 ENCOUNTER — APPOINTMENT (OUTPATIENT)
Dept: OBGYN | Facility: CLINIC | Age: 57
End: 2022-12-14

## 2022-12-14 VITALS
HEIGHT: 60 IN | BODY MASS INDEX: 30.82 KG/M2 | SYSTOLIC BLOOD PRESSURE: 110 MMHG | WEIGHT: 157 LBS | DIASTOLIC BLOOD PRESSURE: 60 MMHG

## 2022-12-14 LAB
TESTOST FREE SERPL-MCNC: 2.9 PG/ML
TESTOST SERPL-MCNC: 10.3 NG/DL

## 2022-12-14 PROCEDURE — 99214 OFFICE O/P EST MOD 30 MIN: CPT

## 2022-12-14 NOTE — HISTORY OF PRESENT ILLNESS
[FreeTextEntry1] : Patient is a 57-year-old female who presents for a 3-month follow-up status post initiation of treatment with bioidentical HRT.  Patient is on estrogen 1 mg/progesterone 25 mg/testosterone 2 mg.  Patient states she is doing well however there was no significant improvement in libido therefore we will increase the testosterone component to 3 mg.  Discussed with patient risk benefits and alternatives reviewed

## 2023-01-12 ENCOUNTER — APPOINTMENT (OUTPATIENT)
Dept: ORTHOPEDIC SURGERY | Facility: CLINIC | Age: 58
End: 2023-01-12
Payer: COMMERCIAL

## 2023-01-12 VITALS
HEIGHT: 60 IN | HEART RATE: 65 BPM | DIASTOLIC BLOOD PRESSURE: 77 MMHG | WEIGHT: 157 LBS | BODY MASS INDEX: 30.82 KG/M2 | SYSTOLIC BLOOD PRESSURE: 118 MMHG

## 2023-01-12 PROCEDURE — 20610 DRAIN/INJ JOINT/BURSA W/O US: CPT | Mod: LT

## 2023-01-17 NOTE — HISTORY OF PRESENT ILLNESS
[de-identified] : The patient comes in today stating overall she has done very well, but she is having some pain over the lateral aspect of her left hip.\par

## 2023-01-17 NOTE — DISCUSSION/SUMMARY
[de-identified] : At this time, I recommended ice and elevation.  She will be reassessed in 3-4 weeks for the left hip trochanteric bursitis status post total hip arthroplasty.\par

## 2023-01-17 NOTE — PHYSICAL EXAM
[de-identified] : Left Hip: Range of Motion in Degrees:\par 	                                 Claimant:	          Normal:	\par Flexion (Active) 	                 120 	          120-degrees	\par Flexion (Passive)	                 120	          120-degrees	\par Extension (Active)	                 -30	          -30-degrees	\par Extension (Passive)	 -30	          -30-degrees	\par Abduction (Active)	                45-50	          42-31-jliaxar	\par Abduction (Passive)	45-50	          55-36-hbkuwpa	\par Adduction (Active)                	20-30	          35-93-gnehqpm	\par Adduction (Passive)	20-30	          61-74-zoeofwe	\par Internal Rotation (Active) 	35	          35-degrees	\par Internal Rotation (Passive)	35	          35-degrees	\par External Rotation (Active)	45	          45-degrees	\par External Rotation (Passive)	45	          45-degrees	\par \par No tenderness with internal or external rotation or axial load.  Point tenderness over the greater trochanter with pain with resisted abduction.  Negative Trendelenburg.  No weakness to flexion, extension, abduction or adduction.  No evidence of instability.  No motor or sensory deficits.  2+ DP and PT pulses.  Well-healed scar.\par \par   [de-identified] : Gait and Station:  Ambulating with a slightly antalgic to antalgic gait.  Normal Station.  [de-identified] : Appearance:  Well developed, well-nourished female in no acute distress.\par   [de-identified] : Radiographs, two to three views of the left hip and pelvis, show overall acceptable position.\par

## 2023-01-17 NOTE — PROCEDURE
[de-identified] : \par Indication:\par Trochanteric bursitis of left hip status post total hip arthroplasty\par \par Consent: \par At this time, I have recommended an injection to the left hip.  The risks and benefits of the procedure were discussed with the patient in detail.  Upon verbal consent of the patient, we proceeded with the injection as noted below.  \par \par Description of Procedure:  \par After a sterile prep, the patient underwent an injection of approximately 9 mL of 1% Lidocaine 10 mg/mL without epinephrine and 1 mL of Kenalog (40 mg/mL) into the left hip.  The patient tolerated the procedure well.  There were no complications. \par \par : [employer]par Drug Name: Triamcinolone Acetonide Injectable Suspension USP\par NDC#: 0143-9577-10\par Lot#:   [id number]\par Expiration Date: 01/2024\par \par \par

## 2023-01-17 NOTE — ADDENDUM
[FreeTextEntry1] : This note was written by Nazario Gomez on 01/17/2023, acting as a scribe for Jay Rowland III, MD

## 2023-01-28 NOTE — H&P PST ADULT - HEMATOLOGY/LYMPHATICS
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    #HFmrEF, acute exacerbation  #Persistent Longstanding Afib - rates well controlled  #HTN  #T2DM  #Morbidy Obesity  #JYOTI   #Graves disease s/p radioiodine ablation c/b hypothyroidism  #Contraction alkalosis    Plan:  - Heart rates are well controlled currently but can use IV Metoprolol 5mg PRN for sustained heart rate greater than 120 bpm (sustained for greater than 5 minutes)  - Consider repeat administration of diamox for contraction alkalosis  - Can give back some fluids, 1/2 NS vs D5 for dehydration  - Although patient has persistent longstanding atrial fibrillation, given her n.p.o. status apixaban can be held with plan to resume when she is able to tolerate p.o. medications  - If antihypertensives are needed can use IV hydralazine    Discussed with Dr. Meyer Mohsan Chaudhry, MD  Division of Cardiology, PGY-6    REASON FOR CONSULT: contraction alkalosis from aggressive diuresis    History of Present Illness   Patient is thirsty.  She denies any shortness of breath at this time.  She reports she has bilateral lower extremity pain.  No lightheadedness or dizziness.  No chest pain.    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       PAST SURGICAL HISTORY:  Past Surgical History:   Procedure Laterality Date    PICC INSERTION - TRIPLE LUMEN Right 01/24/2023    right cephalic 5 fr tl picc 48 cm       ALLERGIES     Allergies   Allergen Reactions    Penicillins Other (See Comments) and Unknown     CHILDHOOD ALLERGY  CHILDHOOD ALLERGY    Ibuprofen Hives and Rash    Ibuprofen Sodium Hives and Rash       FAMILY HISTORY:  Family History   Problem Relation Age of Onset    Thyroid Disease Mother         Grave's D    Diabetes Mother     Heart Disease Mother     Cerebrovascular Disease Mother         dec. 56 yo    Hypertension Mother     Heart Disease Sister         Heart Murmur    Diabetes Sister     Heart Disease Father         dec in his 30s, MI    Psychotic Disorder Brother         Bipolar    Diabetes Brother     Thyroid Disease Brother         Hyper Thyroid    Heart Disease Brother     Thyroid Disease Sister         Hyper Thyroid    Thyroid Disease Sister         Hyper Thyroid    Thyroid Disease Sister         Mental Health Problems    Thyroid Disease Maternal Aunt     Thyroid Disease Maternal Uncle     Cancer No family hx of     Glaucoma No family hx of     Macular Degeneration No family hx of        SOCIAL HISTORY:  Social History     Socioeconomic History    Marital status: Single     Spouse name: None    Number of children: None    Years of education: None    Highest education level: None   Tobacco Use    Smoking status: Light Smoker     Packs/day: 0.25     Years: 13.00     Pack years: 3.25     Types: Cigarettes    Smokeless tobacco: Never    Tobacco comments:     down to 2 cigs a day   Substance and Sexual Activity    Alcohol use: No    Drug use: No    Sexual activity: Yes      Partners: Male     Birth control/protection: Condom   Social History Narrative    Single, no children        Gyn:        Last pap several years ago, no abnormal    No STIs            Patient is single.  She is no longer working.  She used to work in the area of customer service.  She is currently living with her sister in [place], [place].  She has no pets.  Patient has smoked a half pack of cigarettes a day for the past 10 plus years.  She states that she is down to 5 cigarettes a day with the aid of Wellbutrin.  She does not smoke cigars, pipes or chew tobacco.  She has 1 cup of coffee in the morning.  She does not drink alcohol.  Patient does not exercise.        Review of Systems   The 10 point Review of Systems is negative other than noted in the HPI or here.     Physical Exam   Temp: 97.7  F (36.5  C) Temp src: Oral BP: 93/70 Pulse: 104   Resp: 19 SpO2: 96 % O2 Device: BiPAP/CPAP    Vital Signs with Ranges  Temp:  [97.7  F (36.5  C)-98.7  F (37.1  C)] 97.7  F (36.5  C)  Pulse:  [] 104  Resp:  [18-24] 19  BP: ()/(52-86) 93/70  FiO2 (%):  [30 %] 30 %  SpO2:  [93 %-100 %] 96 %  480 lbs 0 oz    GEN: NAD, pleasant  HEENT: no icterus  CV: RRR, normal s1/s2, no murmurs/rubs/s3/s4, no heave. JVP difficult to visualize.  CHEST: CTAB  ABD: soft, NT/ND, NABS  : no flank/suprapubic tenderness  EXT: Her lower extremities are significantly improved from an edema perspective but still has some edema in her thighs.  NEURO: AA&Ox3, fluent/appropriate, motor grossly nonfocal  PSYCH: cooperative, affect appropriate    Data   Data reviewed today:  I personally reviewed  Recent Labs   Lab 23  1636 23  1532 23  1433 23  1047 23  1023 23  0456 23  0311 23  1733 23  1617 23  1409 23  0916 23  0903 23  0502 23  0824 23  0502 23  0755 23  0622 23  0801 23  0650   WBC  --   --   --   --   --   --   8.0  --   --   --   --   --  8.5  --  6.6  --  8.2  --  8.5   HGB  --   --   --   --   --   --  15.3  --   --   --   --   --  14.9  --  15.1  --  15.2  --  15.0   MCV  --   --   --   --   --   --  98  --   --   --   --   --  100  --  103*  --  100  --  97   PLT  --   --   --   --   --   --  213  --   --   --   --   --  221  --  240  --  249  --  242   INR  --   --   --   --   --   --   --   --   --   --   --   --   --   --   --   --  1.44*  --  1.57*   NA  --   --   --   --  143  --  142  --  140  --   --   --  137   < > 140   < > 136   < > 139  139   POTASSIUM  --   --   --   --  3.6  --  4.2  --  3.9  --   --   --  4.0   < > 4.4   < > 5.0   < > 4.7  4.7   CHLORIDE  --   --   --   --  95*  --  93*  --  94*  --   --   --  94*   < > 101   < > 98   < > 101  101   CO2  --   --   --   --  40*  --  33*  --  35*  --   --   --  32*   < > 32*   < > 26   < > 25  25   BUN  --   --   --   --  54.3*  --  56.1*  --  58.6*  --   --   --  60.6*   < > 61.7*   < > 60.1*   < > 55.7*  55.7*   CR  --   --   --   --  1.36*  --  1.41*  --  1.51*  --   --   --  1.47*   < > 1.56*   < > 1.50*   < > 1.62*  1.62*   ANIONGAP  --   --   --   --  8  --  16*  --  11  --   --   --  11   < > 7   < > 12   < > 13  13   JUSTIN  --   --   --   --  9.3  --  9.7  --  9.1  --   --   --  9.2   < > 8.6   < > 9.1   < > 9.0  9.0   GLC 67* 83 71   < > 51*   < > 71   < > 83   < >  --    < > 110*   < > 102*   < > 179*   < > 120*  120*   ALBUMIN  --   --   --   --   --   --  3.1*  --   --   --   --   --   --   --   --   --   --   --  2.7*   PROTTOTAL  --   --   --   --   --   --  7.6  --   --   --   --   --   --   --   --   --   --   --  6.3*   BILITOTAL  --   --   --   --   --   --  1.6*  --   --   --   --   --   --   --   --   --   --   --  1.4*   ALKPHOS  --   --   --   --   --   --  103  --   --   --   --   --   --   --   --   --   --   --  106*   ALT  --   --   --   --   --   --  11  --   --   --  6*  --   --   --   --   --   --   --  11   AST  --   --    --   --   --   --   --   --   --   --  17  --   --   --   --   --   --   --  23    < > = values in this interval not displayed.       No results found for this or any previous visit (from the past 24 hour(s)).    Attending Attestation: I saw, examined and evaluated the patient and reviewed all relevant data. I agree with the findings, and our shared assessment and plan of care documented in the edited note and summarized the mejia findings and plan with the patient.  - pt wants to be transferred back to the cardiology floor  - happy to accept   negative

## 2023-02-22 ENCOUNTER — APPOINTMENT (OUTPATIENT)
Dept: ORTHOPEDIC SURGERY | Facility: CLINIC | Age: 58
End: 2023-02-22
Payer: COMMERCIAL

## 2023-02-22 VITALS
SYSTOLIC BLOOD PRESSURE: 116 MMHG | HEART RATE: 77 BPM | DIASTOLIC BLOOD PRESSURE: 69 MMHG | WEIGHT: 157 LBS | BODY MASS INDEX: 30.82 KG/M2 | HEIGHT: 60 IN

## 2023-02-22 DIAGNOSIS — M70.62 TROCHANTERIC BURSITIS, LEFT HIP: ICD-10-CM

## 2023-02-22 DIAGNOSIS — Z96.642 PRESENCE OF LEFT ARTIFICIAL HIP JOINT: ICD-10-CM

## 2023-02-22 PROCEDURE — 99213 OFFICE O/P EST LOW 20 MIN: CPT

## 2023-02-27 PROBLEM — Z96.642 S/P HIP REPLACEMENT, LEFT: Status: ACTIVE | Noted: 2022-03-07

## 2023-02-27 PROBLEM — M70.62 TROCHANTERIC BURSITIS OF LEFT HIP: Status: ACTIVE | Noted: 2023-01-12

## 2023-02-27 NOTE — PHYSICAL EXAM
[de-identified] : Left Hip: \par Range of Motion in Degrees:\par 	                                 Claimant:	   Normal:	\par Flexion (Active) 	                 120 	   120-degrees	\par Flexion (Passive)	                 120	   120-degrees	\par Extension (Active)	                 -30	   -30-degrees	\par Extension (Passive)	 -30	   -30-degrees	\par Abduction (Active)	                 45-50	   36-39-fomqnfq	\par Abduction (Passive)	 45-50	   08-51-hufxsvf	\par Adduction (Active)  	 20-30	   91-33-wqmkzsx	\par Adduction (Passive)	 20-30	   54-26-yiozohk	\par Internal Rotation (Active) 	 35	   35-degrees	\par Internal Rotation (Passive)	 35	   35-degrees	\par External Rotation (Active)	 45	   45-degrees	\par External Rotation (Passive)	 45	   45-degrees	\par \par No tenderness with internal or external rotation or axial load.  No tenderness to palpation over the greater trochanter.  Negative Trendelenburg.  No tenderness with resisted abduction.  No weakness to flexion, extension, abduction or adduction.  No evidence of instability.  No motor or sensory deficits.  2+ DP and PT pulses.  \par   [de-identified] : Ambulating with a slightly antalgic to antalgic gait.  Station:  Normal.  [de-identified] : Appearance:  Well-developed, well-nourished female in no acute distress.\par

## 2023-02-27 NOTE — DISCUSSION/SUMMARY
[de-identified] : At this time, the patient is doing well with the trochanteric bursitis status post left total hip arthroplasty. We had a long discussion concerning total hip precautions.  She will return to full activity. She will follow up with me on an as needed basis.

## 2023-02-27 NOTE — ADDENDUM
[FreeTextEntry1] : This note was written by America Torres on 02/27/2023 acting as a scribe for THIEN MACHUCA III, MD

## 2023-06-05 ENCOUNTER — NON-APPOINTMENT (OUTPATIENT)
Age: 58
End: 2023-06-05

## 2023-06-07 ENCOUNTER — APPOINTMENT (OUTPATIENT)
Dept: OBGYN | Facility: CLINIC | Age: 58
End: 2023-06-07
Payer: COMMERCIAL

## 2023-06-07 VITALS
HEIGHT: 60 IN | BODY MASS INDEX: 30.82 KG/M2 | SYSTOLIC BLOOD PRESSURE: 116 MMHG | WEIGHT: 157 LBS | DIASTOLIC BLOOD PRESSURE: 70 MMHG

## 2023-06-07 DIAGNOSIS — N95.1 MENOPAUSAL AND FEMALE CLIMACTERIC STATES: ICD-10-CM

## 2023-06-07 DIAGNOSIS — R68.82 DECREASED LIBIDO: ICD-10-CM

## 2023-06-07 DIAGNOSIS — Z92.29 PERSONAL HISTORY OF OTHER DRUG THERAPY: ICD-10-CM

## 2023-06-07 PROCEDURE — 99214 OFFICE O/P EST MOD 30 MIN: CPT

## 2023-06-07 NOTE — HISTORY OF PRESENT ILLNESS
[FreeTextEntry1] : Patient is a 57-year-old female who presents for a 6-month interval HRT follow-up.  Patient is on bioidentical HRT estrogen 1 mg/progesterone 25 mg/testosterone 3 mg cream daily.  Patient states she is doing well feels better however would like to have increase in her libido.  Discussed this issue with patient will increase testosterone to 4 mg.  Patient is amenable.  Patient's last mammogram was December 2022

## 2023-10-01 ENCOUNTER — NON-APPOINTMENT (OUTPATIENT)
Age: 58
End: 2023-10-01

## 2023-10-06 ENCOUNTER — APPOINTMENT (OUTPATIENT)
Dept: OBGYN | Facility: CLINIC | Age: 58
End: 2023-10-06
Payer: COMMERCIAL

## 2023-10-06 VITALS
HEIGHT: 60 IN | DIASTOLIC BLOOD PRESSURE: 72 MMHG | WEIGHT: 146 LBS | BODY MASS INDEX: 28.66 KG/M2 | SYSTOLIC BLOOD PRESSURE: 118 MMHG | RESPIRATION RATE: 16 BRPM

## 2023-10-06 DIAGNOSIS — Z80.3 FAMILY HISTORY OF MALIGNANT NEOPLASM OF BREAST: ICD-10-CM

## 2023-10-06 DIAGNOSIS — R92.30 INCONCLUSIVE MAMMOGRAM: ICD-10-CM

## 2023-10-06 DIAGNOSIS — Z92.29 PERSONAL HISTORY OF OTHER DRUG THERAPY: ICD-10-CM

## 2023-10-06 DIAGNOSIS — R92.2 INCONCLUSIVE MAMMOGRAM: ICD-10-CM

## 2023-10-06 DIAGNOSIS — Z01.419 ENCOUNTER FOR GYNECOLOGICAL EXAMINATION (GENERAL) (ROUTINE) W/OUT ABNORMAL FINDINGS: ICD-10-CM

## 2023-10-06 PROCEDURE — 99396 PREV VISIT EST AGE 40-64: CPT

## 2023-10-06 PROCEDURE — 82270 OCCULT BLOOD FECES: CPT

## 2023-10-10 LAB
CYTOLOGY CVX/VAG DOC THIN PREP: NORMAL
HPV HIGH+LOW RISK DNA PNL CVX: NOT DETECTED

## 2024-01-03 ENCOUNTER — APPOINTMENT (OUTPATIENT)
Dept: OBGYN | Facility: CLINIC | Age: 59
End: 2024-01-03
Payer: COMMERCIAL

## 2024-01-03 VITALS
BODY MASS INDEX: 29.64 KG/M2 | HEIGHT: 60 IN | SYSTOLIC BLOOD PRESSURE: 108 MMHG | WEIGHT: 151 LBS | DIASTOLIC BLOOD PRESSURE: 68 MMHG

## 2024-01-03 DIAGNOSIS — N95.1 MENOPAUSAL AND FEMALE CLIMACTERIC STATES: ICD-10-CM

## 2024-01-03 DIAGNOSIS — Z92.29 PERSONAL HISTORY OF OTHER DRUG THERAPY: ICD-10-CM

## 2024-01-03 DIAGNOSIS — R68.82 DECREASED LIBIDO: ICD-10-CM

## 2024-01-03 DIAGNOSIS — R92.30 DENSE BREASTS, UNSPECIFIED: ICD-10-CM

## 2024-01-03 DIAGNOSIS — Z80.3 FAMILY HISTORY OF MALIGNANT NEOPLASM OF BREAST: ICD-10-CM

## 2024-01-03 PROCEDURE — 99214 OFFICE O/P EST MOD 30 MIN: CPT

## 2024-01-03 RX ORDER — TESTOSTERONE MICRONIZED 100 %
POWDER (GRAM) MISCELLANEOUS
Qty: 1 | Refills: 0 | Status: ACTIVE | COMMUNITY
Start: 2024-01-03 | End: 1900-01-01

## 2024-01-03 NOTE — DISCUSSION/SUMMARY
Male [FreeTextEntry1] : Impression: Family history of breast cancer, menopausal syndrome, hormone replacement therapy, decreased libido  Rx: Estrogen 1 mg/progesterone 25 mg/testosterone 2 mg cream daily-use as directed, instructions and precautions reviewed  Recommendations: Self breast exam, mammography and breast sonogram, vitamin supplementation regular exercise  Follow-up in 6 months

## 2024-01-03 NOTE — PHYSICAL EXAM
[Appropriately responsive] : appropriately responsive [Alert] : alert [No Acute Distress] : no acute distress [No Lymphadenopathy] : no lymphadenopathy [Regular Rate Rhythm] : regular rate rhythm [No Murmurs] : no murmurs [Clear to Auscultation B/L] : clear to auscultation bilaterally [Soft] : soft [Non-tender] : non-tender [Non-distended] : non-distended [No HSM] : No HSM [No Lesions] : no lesions [No Mass] : no mass [Oriented x3] : oriented x3 [FreeTextEntry6] : Breasts are soft nontender no masses palpable, mild fibrocystic changes, there were no skin changes or nipple discharge, axilla negative there is no lymphadenopathy [Vulvar Atrophy] : vulvar atrophy [Labia Majora] : normal [Labia Minora] : normal [Atrophy] : atrophy [Normal] : normal [Uterine Adnexae] : normal

## 2024-01-03 NOTE — HISTORY OF PRESENT ILLNESS
[FreeTextEntry1] : Patient is a 58-year-old female presents for 6-month interval surveillance and maintenance visit.  Patient with a significant family history of breast cancer.  Patient's last mammogram was December 2022.  Patient with dense breasts.  Patient has been on HRT prescribed by an endocrinologist patient would like to switch her care for this issue to this office.  Patient would like to continue her current HRT which we have prescribed in the past.  Bioidentical HRT will prescribe estrogen 1 mg/progesterone 25 mg/testosterone 2 mg cream daily.  Instructions and precautions reviewed.  Patient also has complaints of occasional pelvic cramping no bleeding and no other symptoms

## 2024-03-07 ENCOUNTER — APPOINTMENT (OUTPATIENT)
Dept: MAMMOGRAPHY | Facility: CLINIC | Age: 59
End: 2024-03-07
Payer: COMMERCIAL

## 2024-03-07 ENCOUNTER — RESULT REVIEW (OUTPATIENT)
Age: 59
End: 2024-03-07

## 2024-03-07 ENCOUNTER — OUTPATIENT (OUTPATIENT)
Dept: OUTPATIENT SERVICES | Facility: HOSPITAL | Age: 59
LOS: 1 days | End: 2024-03-07
Payer: COMMERCIAL

## 2024-03-07 ENCOUNTER — APPOINTMENT (OUTPATIENT)
Dept: ULTRASOUND IMAGING | Facility: CLINIC | Age: 59
End: 2024-03-07
Payer: COMMERCIAL

## 2024-03-07 DIAGNOSIS — Z98.890 OTHER SPECIFIED POSTPROCEDURAL STATES: Chronic | ICD-10-CM

## 2024-03-07 DIAGNOSIS — Z96.641 PRESENCE OF RIGHT ARTIFICIAL HIP JOINT: Chronic | ICD-10-CM

## 2024-03-07 DIAGNOSIS — Z00.8 ENCOUNTER FOR OTHER GENERAL EXAMINATION: ICD-10-CM

## 2024-03-07 PROCEDURE — 77067 SCR MAMMO BI INCL CAD: CPT | Mod: 26

## 2024-03-07 PROCEDURE — 76641 ULTRASOUND BREAST COMPLETE: CPT

## 2024-03-07 PROCEDURE — 76641 ULTRASOUND BREAST COMPLETE: CPT | Mod: 26,50

## 2024-03-07 PROCEDURE — 77063 BREAST TOMOSYNTHESIS BI: CPT

## 2024-03-07 PROCEDURE — 77063 BREAST TOMOSYNTHESIS BI: CPT | Mod: 26

## 2024-03-07 PROCEDURE — 77067 SCR MAMMO BI INCL CAD: CPT

## 2024-05-06 ENCOUNTER — APPOINTMENT (OUTPATIENT)
Dept: OBGYN | Facility: CLINIC | Age: 59
End: 2024-05-06
Payer: COMMERCIAL

## 2024-05-06 VITALS
WEIGHT: 140 LBS | DIASTOLIC BLOOD PRESSURE: 64 MMHG | SYSTOLIC BLOOD PRESSURE: 108 MMHG | BODY MASS INDEX: 27.48 KG/M2 | HEIGHT: 60 IN

## 2024-05-06 DIAGNOSIS — R68.82 DECREASED LIBIDO: ICD-10-CM

## 2024-05-06 DIAGNOSIS — Z79.890 HORMONE REPLACEMENT THERAPY: ICD-10-CM

## 2024-05-06 DIAGNOSIS — N95.1 MENOPAUSAL AND FEMALE CLIMACTERIC STATES: ICD-10-CM

## 2024-05-06 DIAGNOSIS — Z92.29 PERSONAL HISTORY OF OTHER DRUG THERAPY: ICD-10-CM

## 2024-05-06 PROCEDURE — 99214 OFFICE O/P EST MOD 30 MIN: CPT

## 2024-05-06 NOTE — DISCUSSION/SUMMARY
[FreeTextEntry1] : Impression: Menopausal syndrome, decreased libido, HRT  Continue bioidentical HRT estrogen 1 mg/progesterone 25 mg/testosterone 2 mg cream daily  Instructions and precautions reviewed, follow-up in 6 months

## 2024-05-06 NOTE — HISTORY OF PRESENT ILLNESS
[FreeTextEntry1] : Patient is a 58-year-old female who presents for 6-month interval HRT surveillance and maintenance visit.  Patient is on bioidentical HRT estrogen 1 mg/progesterone 25 mg/testosterone 2 mg cream daily.  Patient states she is doing well and would like to continue this prescription.  Patient's last mammogram was March 2024.

## 2024-06-07 RX ORDER — TESTOSTERONE MICRONIZED 100 %
POWDER (GRAM) MISCELLANEOUS
Qty: 1 | Refills: 0 | Status: ACTIVE | COMMUNITY
Start: 2024-05-06 | End: 1900-01-01

## 2024-10-07 ENCOUNTER — APPOINTMENT (OUTPATIENT)
Dept: OBGYN | Facility: CLINIC | Age: 59
End: 2024-10-07
Payer: COMMERCIAL

## 2024-10-07 VITALS
HEIGHT: 60 IN | BODY MASS INDEX: 27.48 KG/M2 | DIASTOLIC BLOOD PRESSURE: 60 MMHG | SYSTOLIC BLOOD PRESSURE: 100 MMHG | WEIGHT: 140 LBS

## 2024-10-07 DIAGNOSIS — N95.2 POSTMENOPAUSAL ATROPHIC VAGINITIS: ICD-10-CM

## 2024-10-07 DIAGNOSIS — Z92.29 PERSONAL HISTORY OF OTHER DRUG THERAPY: ICD-10-CM

## 2024-10-07 DIAGNOSIS — N95.1 MENOPAUSAL AND FEMALE CLIMACTERIC STATES: ICD-10-CM

## 2024-10-07 DIAGNOSIS — Z79.890 HORMONE REPLACEMENT THERAPY: ICD-10-CM

## 2024-10-07 DIAGNOSIS — Z01.419 ENCOUNTER FOR GYNECOLOGICAL EXAMINATION (GENERAL) (ROUTINE) W/OUT ABNORMAL FINDINGS: ICD-10-CM

## 2024-10-07 DIAGNOSIS — M85.80 OTHER SPECIFIED DISORDERS OF BONE DENSITY AND STRUCTURE, UNSPECIFIED SITE: ICD-10-CM

## 2024-10-07 PROCEDURE — 99396 PREV VISIT EST AGE 40-64: CPT

## 2024-10-07 PROCEDURE — 82270 OCCULT BLOOD FECES: CPT

## 2024-10-07 RX ORDER — TESTOSTERONE MICRONIZED 100 %
POWDER (GRAM) MISCELLANEOUS
Qty: 100 | Refills: 0 | Status: ACTIVE | COMMUNITY
Start: 2024-10-07 | End: 1900-01-01

## 2024-10-09 LAB
CYTOLOGY CVX/VAG DOC THIN PREP: NORMAL
HPV HIGH+LOW RISK DNA PNL CVX: NOT DETECTED

## 2025-04-07 ENCOUNTER — APPOINTMENT (OUTPATIENT)
Dept: OBGYN | Facility: CLINIC | Age: 60
End: 2025-04-07
Payer: COMMERCIAL

## 2025-04-07 VITALS
HEIGHT: 60 IN | BODY MASS INDEX: 23.95 KG/M2 | DIASTOLIC BLOOD PRESSURE: 70 MMHG | SYSTOLIC BLOOD PRESSURE: 116 MMHG | WEIGHT: 122 LBS | RESPIRATION RATE: 16 BRPM

## 2025-04-07 DIAGNOSIS — Z87.39 PERSONAL HISTORY OF OTHER DISEASES OF THE MUSCULOSKELETAL SYSTEM AND CONNECTIVE TISSUE: ICD-10-CM

## 2025-04-07 DIAGNOSIS — R92.30 DENSE BREASTS, UNSPECIFIED: ICD-10-CM

## 2025-04-07 DIAGNOSIS — M85.80 OTHER SPECIFIED DISORDERS OF BONE DENSITY AND STRUCTURE, UNSPECIFIED SITE: ICD-10-CM

## 2025-04-07 DIAGNOSIS — R68.82 DECREASED LIBIDO: ICD-10-CM

## 2025-04-07 DIAGNOSIS — N95.1 MENOPAUSAL AND FEMALE CLIMACTERIC STATES: ICD-10-CM

## 2025-04-07 DIAGNOSIS — Z92.29 PERSONAL HISTORY OF OTHER DRUG THERAPY: ICD-10-CM

## 2025-04-07 DIAGNOSIS — Z79.890 HORMONE REPLACEMENT THERAPY: ICD-10-CM

## 2025-04-07 PROCEDURE — G2211 COMPLEX E/M VISIT ADD ON: CPT | Mod: NC

## 2025-04-07 PROCEDURE — 99214 OFFICE O/P EST MOD 30 MIN: CPT

## 2025-04-07 RX ORDER — TESTOSTERONE MICRONIZED 100 %
POWDER (GRAM) MISCELLANEOUS
Qty: 100 | Refills: 0 | Status: ACTIVE | COMMUNITY
Start: 2025-04-07 | End: 1900-01-01

## 2025-06-03 ENCOUNTER — APPOINTMENT (OUTPATIENT)
Dept: RADIOLOGY | Facility: CLINIC | Age: 60
End: 2025-06-03
Payer: COMMERCIAL

## 2025-06-03 ENCOUNTER — OUTPATIENT (OUTPATIENT)
Dept: OUTPATIENT SERVICES | Facility: HOSPITAL | Age: 60
LOS: 1 days | End: 2025-06-03
Payer: COMMERCIAL

## 2025-06-03 ENCOUNTER — RESULT REVIEW (OUTPATIENT)
Age: 60
End: 2025-06-03

## 2025-06-03 ENCOUNTER — APPOINTMENT (OUTPATIENT)
Dept: MAMMOGRAPHY | Facility: CLINIC | Age: 60
End: 2025-06-03
Payer: COMMERCIAL

## 2025-06-03 ENCOUNTER — APPOINTMENT (OUTPATIENT)
Dept: ULTRASOUND IMAGING | Facility: CLINIC | Age: 60
End: 2025-06-03
Payer: COMMERCIAL

## 2025-06-03 DIAGNOSIS — Z00.8 ENCOUNTER FOR OTHER GENERAL EXAMINATION: ICD-10-CM

## 2025-06-03 DIAGNOSIS — Z87.39 PERSONAL HISTORY OF OTHER DISEASES OF THE MUSCULOSKELETAL SYSTEM AND CONNECTIVE TISSUE: ICD-10-CM

## 2025-06-03 DIAGNOSIS — Z98.890 OTHER SPECIFIED POSTPROCEDURAL STATES: Chronic | ICD-10-CM

## 2025-06-03 DIAGNOSIS — Z91.89 OTHER SPECIFIED PERSONAL RISK FACTORS, NOT ELSEWHERE CLASSIFIED: ICD-10-CM

## 2025-06-03 DIAGNOSIS — Z96.641 PRESENCE OF RIGHT ARTIFICIAL HIP JOINT: Chronic | ICD-10-CM

## 2025-06-03 PROCEDURE — 77085 DXA BONE DENSITY AXL VRT FX: CPT | Mod: 26

## 2025-06-03 PROCEDURE — 77063 BREAST TOMOSYNTHESIS BI: CPT | Mod: 26

## 2025-06-03 PROCEDURE — 77067 SCR MAMMO BI INCL CAD: CPT | Mod: 26

## 2025-06-03 PROCEDURE — 77067 SCR MAMMO BI INCL CAD: CPT

## 2025-06-03 PROCEDURE — 77063 BREAST TOMOSYNTHESIS BI: CPT

## 2025-06-03 PROCEDURE — 77085 DXA BONE DENSITY AXL VRT FX: CPT

## 2025-06-05 DIAGNOSIS — N64.89 OTHER SPECIFIED DISORDERS OF BREAST: ICD-10-CM

## 2025-06-10 ENCOUNTER — NON-APPOINTMENT (OUTPATIENT)
Age: 60
End: 2025-06-10

## 2025-06-12 ENCOUNTER — APPOINTMENT (OUTPATIENT)
Dept: ULTRASOUND IMAGING | Facility: CLINIC | Age: 60
End: 2025-06-12

## 2025-06-24 ENCOUNTER — RESULT REVIEW (OUTPATIENT)
Age: 60
End: 2025-06-24

## 2025-06-24 ENCOUNTER — APPOINTMENT (OUTPATIENT)
Dept: ULTRASOUND IMAGING | Facility: CLINIC | Age: 60
End: 2025-06-24
Payer: COMMERCIAL

## 2025-06-24 ENCOUNTER — OUTPATIENT (OUTPATIENT)
Dept: OUTPATIENT SERVICES | Facility: HOSPITAL | Age: 60
LOS: 1 days | End: 2025-06-24
Payer: COMMERCIAL

## 2025-06-24 ENCOUNTER — APPOINTMENT (OUTPATIENT)
Dept: MAMMOGRAPHY | Facility: CLINIC | Age: 60
End: 2025-06-24
Payer: COMMERCIAL

## 2025-06-24 DIAGNOSIS — Z98.890 OTHER SPECIFIED POSTPROCEDURAL STATES: Chronic | ICD-10-CM

## 2025-06-24 DIAGNOSIS — Z00.8 ENCOUNTER FOR OTHER GENERAL EXAMINATION: ICD-10-CM

## 2025-06-24 DIAGNOSIS — Z96.641 PRESENCE OF RIGHT ARTIFICIAL HIP JOINT: Chronic | ICD-10-CM

## 2025-06-24 PROCEDURE — 76642 ULTRASOUND BREAST LIMITED: CPT | Mod: 26,RT

## 2025-06-24 PROCEDURE — 77065 DX MAMMO INCL CAD UNI: CPT | Mod: 26,RT

## 2025-06-24 PROCEDURE — G0279: CPT | Mod: 26

## 2025-06-24 PROCEDURE — 76642 ULTRASOUND BREAST LIMITED: CPT

## 2025-06-24 PROCEDURE — 77065 DX MAMMO INCL CAD UNI: CPT

## 2025-06-24 PROCEDURE — G0279: CPT

## 2025-06-25 PROBLEM — N64.89 BREAST ASYMMETRY: Status: RESOLVED | Noted: 2025-06-05 | Resolved: 2025-06-25

## 2025-06-25 PROBLEM — N64.89 BREAST ASYMMETRY: Status: ACTIVE | Noted: 2025-06-25

## 2025-07-10 ENCOUNTER — OUTPATIENT (OUTPATIENT)
Dept: OUTPATIENT SERVICES | Facility: HOSPITAL | Age: 60
LOS: 1 days | End: 2025-07-10
Payer: COMMERCIAL

## 2025-07-10 ENCOUNTER — APPOINTMENT (OUTPATIENT)
Dept: MAMMOGRAPHY | Facility: CLINIC | Age: 60
End: 2025-07-10
Payer: COMMERCIAL

## 2025-07-10 ENCOUNTER — RESULT REVIEW (OUTPATIENT)
Age: 60
End: 2025-07-10

## 2025-07-10 DIAGNOSIS — Z98.890 OTHER SPECIFIED POSTPROCEDURAL STATES: Chronic | ICD-10-CM

## 2025-07-10 DIAGNOSIS — N64.89 OTHER SPECIFIED DISORDERS OF BREAST: ICD-10-CM

## 2025-07-10 PROCEDURE — 88305 TISSUE EXAM BY PATHOLOGIST: CPT | Mod: 26

## 2025-07-10 PROCEDURE — 77065 DX MAMMO INCL CAD UNI: CPT

## 2025-07-10 PROCEDURE — 19081 BX BREAST 1ST LESION STRTCTC: CPT | Mod: RT

## 2025-07-10 PROCEDURE — 77065 DX MAMMO INCL CAD UNI: CPT | Mod: 26,RT

## 2025-07-10 PROCEDURE — A4648: CPT

## 2025-07-10 PROCEDURE — 88305 TISSUE EXAM BY PATHOLOGIST: CPT

## 2025-07-10 PROCEDURE — 19081 BX BREAST 1ST LESION STRTCTC: CPT

## 2025-07-14 LAB — SURGICAL PATHOLOGY STUDY: SIGNIFICANT CHANGE UP
